# Patient Record
Sex: MALE | Race: WHITE | NOT HISPANIC OR LATINO | Employment: FULL TIME | URBAN - METROPOLITAN AREA
[De-identification: names, ages, dates, MRNs, and addresses within clinical notes are randomized per-mention and may not be internally consistent; named-entity substitution may affect disease eponyms.]

---

## 2017-01-04 DIAGNOSIS — M10.40: ICD-10-CM

## 2017-01-04 RX ORDER — INDOMETHACIN 25 MG/1
CAPSULE ORAL
Qty: 30 CAPSULE | Refills: 0 | Status: SHIPPED | OUTPATIENT
Start: 2017-01-04 | End: 2017-01-11 | Stop reason: SDUPTHER

## 2017-01-11 ENCOUNTER — OFFICE VISIT (OUTPATIENT)
Dept: FAMILY MEDICINE | Facility: CLINIC | Age: 42
End: 2017-01-11
Payer: COMMERCIAL

## 2017-01-11 ENCOUNTER — LAB VISIT (OUTPATIENT)
Dept: LAB | Facility: HOSPITAL | Age: 42
End: 2017-01-11
Attending: FAMILY MEDICINE
Payer: COMMERCIAL

## 2017-01-11 VITALS
TEMPERATURE: 98 F | DIASTOLIC BLOOD PRESSURE: 84 MMHG | HEIGHT: 72 IN | RESPIRATION RATE: 18 BRPM | BODY MASS INDEX: 40.31 KG/M2 | OXYGEN SATURATION: 97 % | SYSTOLIC BLOOD PRESSURE: 130 MMHG | HEART RATE: 72 BPM | WEIGHT: 297.63 LBS

## 2017-01-11 DIAGNOSIS — M10.9 ACUTE GOUT OF LEFT FOOT, UNSPECIFIED CAUSE: Primary | ICD-10-CM

## 2017-01-11 DIAGNOSIS — R73.9 BLOOD GLUCOSE ELEVATED: ICD-10-CM

## 2017-01-11 LAB
CHOLEST/HDLC SERPL: 4.5 {RATIO}
HDL/CHOLESTEROL RATIO: 22.1 %
HDLC SERPL-MCNC: 208 MG/DL
HDLC SERPL-MCNC: 46 MG/DL
LDLC SERPL CALC-MCNC: 129.4 MG/DL
NONHDLC SERPL-MCNC: 162 MG/DL
TRIGL SERPL-MCNC: 163 MG/DL

## 2017-01-11 PROCEDURE — 99214 OFFICE O/P EST MOD 30 MIN: CPT | Mod: S$GLB,,, | Performed by: FAMILY MEDICINE

## 2017-01-11 PROCEDURE — 3079F DIAST BP 80-89 MM HG: CPT | Mod: S$GLB,,, | Performed by: FAMILY MEDICINE

## 2017-01-11 PROCEDURE — 80061 LIPID PANEL: CPT

## 2017-01-11 PROCEDURE — 1159F MED LIST DOCD IN RCRD: CPT | Mod: S$GLB,,, | Performed by: FAMILY MEDICINE

## 2017-01-11 PROCEDURE — 99999 PR PBB SHADOW E&M-EST. PATIENT-LVL III: CPT | Mod: PBBFAC,,, | Performed by: FAMILY MEDICINE

## 2017-01-11 PROCEDURE — 36415 COLL VENOUS BLD VENIPUNCTURE: CPT | Mod: PO

## 2017-01-11 PROCEDURE — 3075F SYST BP GE 130 - 139MM HG: CPT | Mod: S$GLB,,, | Performed by: FAMILY MEDICINE

## 2017-01-11 PROCEDURE — 83036 HEMOGLOBIN GLYCOSYLATED A1C: CPT

## 2017-01-11 RX ORDER — INDOMETHACIN 25 MG/1
25 CAPSULE ORAL 3 TIMES DAILY PRN
Qty: 30 CAPSULE | Refills: 6 | Status: SHIPPED | OUTPATIENT
Start: 2017-01-11 | End: 2017-06-02 | Stop reason: SDUPTHER

## 2017-01-11 NOTE — PROGRESS NOTES
Chief Complaint   Patient presents with    Medication Refill     Indocin 25 mg.     Foot Pain     on top of his Left foot. Mildly uncomfortable       HPI  Paolo Carver Jr. is a 41 y.o. male with multiple medical diagnoses as listed in the medical history and problem list that presents for gout flare.      Gout flare - last week, foot pain prog worsened, began indocin therapy and has improved.     Elevated BG - +DM2 Fam hx.    Pt is known to me and was last seen by me on 9/29/2016.    PAST MEDICAL HISTORY:  Past Medical History   Diagnosis Date    Gout     Hypertension        PAST SURGICAL HISTORY:  No past surgical history on file.    SOCIAL HISTORY:  Social History     Social History    Marital status: Significant Other     Spouse name: N/A    Number of children: N/A    Years of education: N/A     Occupational History    Not on file.     Social History Main Topics    Smoking status: Current Every Day Smoker    Smokeless tobacco: Not on file    Alcohol use Yes    Drug use: Not on file    Sexual activity: Yes     Partners: Female     Other Topics Concern    Not on file     Social History Narrative       FAMILY HISTORY:  No family history on file.    ALLERGIES AND MEDICATIONS: updated and reviewed.  Review of patient's allergies indicates:  No Known Allergies  Current Outpatient Prescriptions   Medication Sig Dispense Refill    benazepril (LOTENSIN) 20 MG tablet TK 1 T PO QD 30 tablet 11    varenicline (CHANTIX STARTING MONTH BOX) 0.5 mg (11)- 1 mg (42) tablet Take one 0.5mg tab by mouth once daily X3 days,then increase to one 0.5mg tab twice daily X4 days,then increase to one 1mg tab twice daily 1 Package 0    indomethacin (INDOCIN) 25 MG capsule Take 1 capsule (25 mg total) by mouth 3 (three) times daily as needed. 30 capsule 6    ULORIC 40 mg Tab TK 1 T PO D. START NOW  1    varenicline (CHANTIX) 1 mg Tab Take 1 tablet (1 mg total) by mouth 2 (two) times daily. 60 tablet 1     No current  facility-administered medications for this visit.        ROS  Review of Systems   Constitutional: Negative for activity change, fatigue and fever.   HENT: Negative for congestion, rhinorrhea and sore throat.    Eyes: Negative for visual disturbance.   Respiratory: Negative for cough, chest tightness and shortness of breath.    Cardiovascular: Negative for chest pain.   Gastrointestinal: Negative for abdominal pain, diarrhea, nausea and vomiting.   Genitourinary: Negative for dysuria, frequency and urgency.   Musculoskeletal: Positive for arthralgias and joint swelling. Negative for back pain and myalgias.   Skin: Negative for rash.   Neurological: Negative for dizziness, syncope and numbness.   Psychiatric/Behavioral: Negative for agitation.       Physical Exam  Vitals:    01/11/17 0822   BP: 130/84   Pulse: 72   Resp: 18   Temp: 98 °F (36.7 °C)    Body mass index is 40.36 kg/(m^2).  Weight: 135 kg (297 lb 9.9 oz)   Height: 6' (182.9 cm)     Physical Exam   Constitutional: He is oriented to person, place, and time. He appears well-developed and well-nourished.   HENT:   Head: Normocephalic and atraumatic.   Eyes: Conjunctivae and EOM are normal. Pupils are equal, round, and reactive to light.   Neck: Normal range of motion. Neck supple.   Cardiovascular: Normal rate, regular rhythm and normal heart sounds.    Pulmonary/Chest: Effort normal and breath sounds normal.   Abdominal: Soft. Bowel sounds are normal.   Musculoskeletal: Normal range of motion.   Neurological: He is alert and oriented to person, place, and time. He has normal reflexes.   L foot pain   Skin: Skin is warm and dry.   Psychiatric: He has a normal mood and affect. His behavior is normal. Judgment and thought content normal.       Health Maintenance       Date Due Completion Date    TETANUS VACCINE 9/5/1995 ---    Pneumococcal PPSV23 (Medium Risk) (1) 9/5/1995 ---    Influenza Vaccine 8/1/2016 ---    Lipid Panel 6/27/2021 6/27/2016           Assessment & Plan    Acute gout of left foot, unspecified cause  -     indomethacin (INDOCIN) 25 MG capsule; Take 1 capsule (25 mg total) by mouth 3 (three) times daily as needed.  Dispense: 30 capsule; Refill: 6  - Refilled meds    Blood glucose elevated  -     Hemoglobin A1c; Future; Expected date: 1/11/17  -     Lipid panel; Future; Expected date: 1/11/17  - Assess labs today.         Return in about 4 weeks (around 2/8/2017), or if symptoms worsen or fail to improve.

## 2017-01-12 LAB
ESTIMATED AVG GLUCOSE: 186 MG/DL
HBA1C MFR BLD HPLC: 8.1 %

## 2017-03-15 ENCOUNTER — OFFICE VISIT (OUTPATIENT)
Dept: FAMILY MEDICINE | Facility: CLINIC | Age: 42
End: 2017-03-15
Payer: COMMERCIAL

## 2017-03-15 VITALS
OXYGEN SATURATION: 97 % | BODY MASS INDEX: 39.3 KG/M2 | HEIGHT: 72 IN | TEMPERATURE: 98 F | DIASTOLIC BLOOD PRESSURE: 86 MMHG | SYSTOLIC BLOOD PRESSURE: 124 MMHG | WEIGHT: 290.13 LBS | RESPIRATION RATE: 17 BRPM | HEART RATE: 100 BPM

## 2017-03-15 DIAGNOSIS — R10.9 FLANK PAIN: Primary | ICD-10-CM

## 2017-03-15 DIAGNOSIS — M62.830 BACK SPASM: ICD-10-CM

## 2017-03-15 LAB
BILIRUB SERPL-MCNC: NEGATIVE MG/DL
BLOOD URINE, POC: NEGATIVE
COLOR, POC UA: YELLOW
GLUCOSE UR QL STRIP: NORMAL
KETONES UR QL STRIP: NEGATIVE
LEUKOCYTE ESTERASE URINE, POC: NEGATIVE
NITRITE, POC UA: NEGATIVE
PH, POC UA: 5
PROTEIN, POC: NEGATIVE
SPECIFIC GRAVITY, POC UA: 1.01
UROBILINOGEN, POC UA: NORMAL

## 2017-03-15 PROCEDURE — 96372 THER/PROPH/DIAG INJ SC/IM: CPT | Mod: S$GLB,,, | Performed by: FAMILY MEDICINE

## 2017-03-15 PROCEDURE — 1160F RVW MEDS BY RX/DR IN RCRD: CPT | Mod: S$GLB,,, | Performed by: FAMILY MEDICINE

## 2017-03-15 PROCEDURE — 3079F DIAST BP 80-89 MM HG: CPT | Mod: S$GLB,,, | Performed by: FAMILY MEDICINE

## 2017-03-15 PROCEDURE — 99999 PR PBB SHADOW E&M-EST. PATIENT-LVL III: CPT | Mod: PBBFAC,,, | Performed by: FAMILY MEDICINE

## 2017-03-15 PROCEDURE — 81001 URINALYSIS AUTO W/SCOPE: CPT | Mod: S$GLB,,, | Performed by: FAMILY MEDICINE

## 2017-03-15 PROCEDURE — 99214 OFFICE O/P EST MOD 30 MIN: CPT | Mod: 25,S$GLB,, | Performed by: FAMILY MEDICINE

## 2017-03-15 PROCEDURE — 3074F SYST BP LT 130 MM HG: CPT | Mod: S$GLB,,, | Performed by: FAMILY MEDICINE

## 2017-03-15 RX ORDER — KETOROLAC TROMETHAMINE 30 MG/ML
60 INJECTION, SOLUTION INTRAMUSCULAR; INTRAVENOUS
Status: COMPLETED | OUTPATIENT
Start: 2017-03-15 | End: 2017-03-15

## 2017-03-15 RX ORDER — HYDROCODONE BITARTRATE AND ACETAMINOPHEN 5; 325 MG/1; MG/1
1 TABLET ORAL 2 TIMES DAILY PRN
Qty: 30 TABLET | Refills: 0 | Status: SHIPPED | OUTPATIENT
Start: 2017-03-15 | End: 2017-08-21

## 2017-03-15 RX ORDER — CYCLOBENZAPRINE HCL 5 MG
5 TABLET ORAL 3 TIMES DAILY PRN
Qty: 40 TABLET | Refills: 1 | Status: SHIPPED | OUTPATIENT
Start: 2017-03-15 | End: 2017-03-25

## 2017-03-15 RX ADMIN — KETOROLAC TROMETHAMINE 60 MG: 30 INJECTION, SOLUTION INTRAMUSCULAR; INTRAVENOUS at 05:03

## 2017-03-15 NOTE — PROGRESS NOTES
Pt tolerated injection of toradol 60mg to left ventrogluteal without difficulty; no adverse reaction noted

## 2017-03-15 NOTE — PROGRESS NOTES
Chief Complaint   Patient presents with    rt side pain     went to [facility] on Monday        HPI  Paolo Carver Jr. is a 41 y.o. male with multiple medical diagnoses as listed in the medical history and problem list that presents for flank pain.      Flank pain - 5 days ago, started, no trauma, began while lying in bed, woke up sore the next morning, went to [facility]: given pain relievers, toradol IM (no relief).     Pt is known to me and was last seen by me on 1/11/2017.    PAST MEDICAL HISTORY:  Past Medical History:   Diagnosis Date    Gout     Hypertension        PAST SURGICAL HISTORY:  History reviewed. No pertinent surgical history.    SOCIAL HISTORY:  Social History     Social History    Marital status: Significant Other     Spouse name: N/A    Number of children: N/A    Years of education: N/A     Occupational History    Not on file.     Social History Main Topics    Smoking status: Current Every Day Smoker    Smokeless tobacco: Not on file    Alcohol use Yes    Drug use: Not on file    Sexual activity: Yes     Partners: Female     Other Topics Concern    Not on file     Social History Narrative       FAMILY HISTORY:  History reviewed. No pertinent family history.    ALLERGIES AND MEDICATIONS: updated and reviewed.  Review of patient's allergies indicates:  No Known Allergies  Current Outpatient Prescriptions   Medication Sig Dispense Refill    benazepril (LOTENSIN) 20 MG tablet TK 1 T PO QD 30 tablet 11    ULORIC 40 mg Tab TK 1 T PO D. START NOW  1    hydrocodone-acetaminophen 5-325mg (NORCO) 5-325 mg per tablet Take 1 tablet by mouth 2 (two) times daily as needed for Pain. 30 tablet 0    indomethacin (INDOCIN) 25 MG capsule Take 1 capsule (25 mg total) by mouth 3 (three) times daily as needed. 30 capsule 6    varenicline (CHANTIX STARTING MONTH BOX) 0.5 mg (11)- 1 mg (42) tablet Take one 0.5mg tab by mouth once daily X3 days,then increase to one 0.5mg tab twice daily X4 days,then  increase to one 1mg tab twice daily 1 Package 0    varenicline (CHANTIX) 1 mg Tab Take 1 tablet (1 mg total) by mouth 2 (two) times daily. 60 tablet 1     No current facility-administered medications for this visit.        ROS  Review of Systems   Constitutional: Negative for activity change, fatigue and fever.   HENT: Negative for congestion, rhinorrhea and sore throat.    Eyes: Negative for visual disturbance.   Respiratory: Negative for cough, chest tightness and shortness of breath.    Cardiovascular: Negative for chest pain.   Gastrointestinal: Negative for abdominal pain, diarrhea, nausea and vomiting.   Genitourinary: Negative for dysuria, frequency and urgency.   Musculoskeletal: Positive for arthralgias and back pain. Negative for myalgias.   Skin: Negative for rash.   Neurological: Negative for dizziness, syncope and numbness.   Psychiatric/Behavioral: Negative for agitation.       Physical Exam  Vitals:    03/15/17 1617   BP: 124/86   Pulse: 100   Resp: 17   Temp: 97.8 °F (36.6 °C)    Body mass index is 39.35 kg/(m^2).  Weight: 131.6 kg (290 lb 2 oz)   Height: 6' (182.9 cm)     Physical Exam   Constitutional: He is oriented to person, place, and time. He appears well-developed and well-nourished.   HENT:   Head: Normocephalic.   Eyes: EOM are normal.   Neck: Normal range of motion.   Musculoskeletal:   Lumbar/flank - dec ROM, mild TTP    Neurological: He is alert and oriented to person, place, and time.   Skin: Skin is warm and dry.   Psychiatric: He has a normal mood and affect. His behavior is normal. Judgment and thought content normal.   Nursing note and vitals reviewed.      Health Maintenance       Date Due Completion Date    Foot Exam 9/5/1987 ---    Eye Exam 9/5/1987 ---    TETANUS VACCINE 9/5/1995 ---    Pneumococcal PPSV23 (Medium Risk) (1) 9/5/1995 ---    Influenza Vaccine 8/1/2016 ---    Hemoglobin A1c 7/11/2017 1/11/2017    Lipid Panel 1/11/2018 1/11/2017          Assessment & Plan    Flank  pain  -     X-Ray Abdomen AP 1 View; Future; Expected date: 3/15/17  -     CBC auto differential; Future; Expected date: 3/15/17  -     Comprehensive metabolic panel; Future; Expected date: 3/15/17  -     ketorolac injection 60 mg; Inject 2 mLs (60 mg total) into the muscle one time.  -     cyclobenzaprine (FLEXERIL) 5 MG tablet; Take 1 tablet (5 mg total) by mouth 3 (three) times daily as needed for Muscle spasms.  Dispense: 40 tablet; Refill: 1  -     hydrocodone-acetaminophen 5-325mg (NORCO) 5-325 mg per tablet; Take 1 tablet by mouth 2 (two) times daily as needed for Pain.  Dispense: 30 tablet; Refill: 0  -     POCT urinalysis, dipstick or tablet reag  - Will treat with pain relief at this time, temporarily    Back spasm  -     ketorolac injection 60 mg; Inject 2 mLs (60 mg total) into the muscle one time.  -     cyclobenzaprine (FLEXERIL) 5 MG tablet; Take 1 tablet (5 mg total) by mouth 3 (three) times daily as needed for Muscle spasms.  Dispense: 40 tablet; Refill: 1  -     hydrocodone-acetaminophen 5-325mg (NORCO) 5-325 mg per tablet; Take 1 tablet by mouth 2 (two) times daily as needed for Pain.  Dispense: 30 tablet; Refill: 0        Return in about 4 weeks (around 4/12/2017).

## 2017-03-16 ENCOUNTER — HOSPITAL ENCOUNTER (OUTPATIENT)
Dept: RADIOLOGY | Facility: HOSPITAL | Age: 42
Discharge: HOME | End: 2017-03-16
Attending: FAMILY MEDICINE
Payer: COMMERCIAL

## 2017-03-16 ENCOUNTER — PATIENT MESSAGE (OUTPATIENT)
Dept: FAMILY MEDICINE | Facility: CLINIC | Age: 42
End: 2017-03-16

## 2017-03-16 DIAGNOSIS — R10.9 FLANK PAIN: ICD-10-CM

## 2017-03-16 PROCEDURE — 74000 XR ABDOMEN AP 1 VIEW: CPT | Mod: TC,PO

## 2017-03-16 PROCEDURE — 74000 XR ABDOMEN AP 1 VIEW: CPT | Mod: 26,,, | Performed by: RADIOLOGY

## 2017-05-11 ENCOUNTER — PATIENT MESSAGE (OUTPATIENT)
Dept: ADMINISTRATIVE | Facility: OTHER | Age: 42
End: 2017-05-11

## 2017-06-01 ENCOUNTER — PATIENT MESSAGE (OUTPATIENT)
Dept: ADMINISTRATIVE | Facility: OTHER | Age: 42
End: 2017-06-01

## 2017-06-02 ENCOUNTER — PATIENT MESSAGE (OUTPATIENT)
Dept: FAMILY MEDICINE | Facility: CLINIC | Age: 42
End: 2017-06-02

## 2017-06-02 DIAGNOSIS — M10.9 ACUTE GOUT OF LEFT FOOT, UNSPECIFIED CAUSE: ICD-10-CM

## 2017-06-02 RX ORDER — INDOMETHACIN 25 MG/1
25 CAPSULE ORAL 3 TIMES DAILY PRN
Qty: 30 CAPSULE | Refills: 6 | Status: SHIPPED | OUTPATIENT
Start: 2017-06-02 | End: 2017-11-13 | Stop reason: SDUPTHER

## 2017-07-31 DIAGNOSIS — E11.9 TYPE 2 DIABETES MELLITUS WITHOUT COMPLICATION: ICD-10-CM

## 2017-08-15 ENCOUNTER — PATIENT MESSAGE (OUTPATIENT)
Dept: OTHER | Facility: OTHER | Age: 42
End: 2017-08-15

## 2017-08-21 ENCOUNTER — PATIENT OUTREACH (OUTPATIENT)
Dept: OTHER | Facility: OTHER | Age: 42
End: 2017-08-21

## 2017-08-21 NOTE — LETTER
Mone Frankel, Arlene  [address] [place] [address]     Dear Paolo Carver Jr.,    Welcome to the [facility]!         My name is Mone Frankel PharmD and I am your dedicated Digital Medicine clinician.  As an expert in medication management, I will help ensure that the medications you are taking continue to provide you with the intended benefits.      I am Mone Clifford and I will be your health  for the duration of the program.  My  job is to help you identify lifestyle changes to improve your blood pressure control.  We will talk about nutrition, exercise, and other ways that you may be able to adjust your current habits to better your health. Together, we will work to improve your overall health and encourage you to meet your goals for a healthier lifestyle.    What we expect from YOU:    You will need to take blood pressure readings multiple times a week and no less than one reading per week.   It is important that you take your measurements at different times during the day, when possible.     What you should expect from your Digital Medicine Care Team:   We will provide you with education about high blood pressure, including lifestyle changes that could help you to control your blood pressure.   We will review your weekly readings and provide you with monthly blood pressure progress reports after you have been in the program for more than 30 days.   We will send monthly progress reports on your blood pressure control to your physician so they can follow along with your progress as well.    You will be able to reach me by phone at [phone] or through your [facility] account by clicking my name under Care Team on the right side of the home screen.    I look forward to working with you to achieve your blood pressure goals!    Sincerely,    Mone Frankel PharmD  Your personal clinician    Please visit  [email or web address] to learn more about high blood pressure and what you can do lower your blood pressure.                                                                                           Paolo Carver Jr.  [address] [place] [address]  [place] [id number]

## 2017-08-21 NOTE — TELEPHONE ENCOUNTER
[facility] Digital Medicine Program Medication Reconciliation Outreach    Called patient to introduce him into the HDMP. Patient able to complete enrollment without difficulty. Reviewed program details including blood pressure goals, technique for taking readings (timing, cuff placement, body positioning, [facility] dmitri), and what to do in case of emergency. Introduced patient to members of care team (health , clinician, and responsible provider). Verified patient's understanding of [facility] dmitri use for contacting clinical team and to ensure that [facility] cuff readings continue to transmit by logging in once every 2 weeks. Confirmation text sent and patient received.    Screening Questionnaire Review:  1. Depression - not indicated  2. Sleep apnea - yes     JULIANNA screening results for this patient suggest a high likelihood of sleep apnea, which can contribute to hypertension. Patient has been previously diagnosed with sleep apnea. Patient reports consistent CPAP use at least 7 hours per night -- CPAP machine use x1 year. JULIANNA is managed effectively with CPAP use. Mr. Carver has been having some issues with his CPAP linking with Bluetooth on his phone. He is in the process of fixing this and will let us know when this is resolved.      Verified the following information with the patient:  1. Medication list  Current Outpatient Prescriptions on File Prior to Visit   Medication Sig Dispense Refill    benazepril (LOTENSIN) 20 MG tablet TK 1 T PO QD 30 tablet 11    indomethacin (INDOCIN) 25 MG capsule Take 1 capsule (25 mg total) by mouth 3 (three) times daily as needed. 30 capsule 6    varenicline (CHANTIX STARTING MONTH BOX) 0.5 mg (11)- 1 mg (42) tablet Take one 0.5mg tab by mouth once daily X3 days,then increase to one 0.5mg tab twice daily X4 days,then increase to one 1mg tab twice daily 1 Package 0    varenicline (CHANTIX) 1 mg Tab Take 1 tablet (1 mg total) by mouth 2 (two) times daily. 60 tablet 1     [DISCONTINUED] hydrocodone-acetaminophen 5-325mg (NORCO) 5-325 mg per tablet Take 1 tablet by mouth 2 (two) times daily as needed for Pain. 30 tablet 0    [DISCONTINUED] ULORIC 40 mg Tab TK 1 T PO D. START NOW  1     No current facility-administered medications on file prior to visit.        No Previous ADRs    2. Medication compliance: has been compliant with the medication regimen    3. Medication Allergies: Review of patient's allergies indicates: No Known Allergies    Explained that our goal is to get his BP consistently below 140/90mmHg. Patient denies having further questions, concerns. BP is at goal.       Last 5 Patient Entered Redings Current 30 Day Average: 129/94     Recent Readings 8/19/2017 8/18/2017 8/16/2017 8/15/2017 8/15/2017    Systolic BP (mmHg) 133 125 123 133 136    Diastolic BP (mmHg) 99 91 91 95 87    Pulse 124 131 95 96 92

## 2017-09-01 ENCOUNTER — PATIENT OUTREACH (OUTPATIENT)
Dept: OTHER | Facility: OTHER | Age: 42
End: 2017-09-01

## 2017-09-01 NOTE — PROGRESS NOTES
Last 5 Patient Entered Redings Current 30 Day Average: 128/91     Recent Readings 8/23/2017 8/23/2017 8/23/2017 8/22/2017 8/22/2017    Systolic BP (mmHg) 130 135 141 122 131    Diastolic BP (mmHg) 94 90 100 70 93    Pulse 104 108 110 73 107        Patient called and informed me that he is out working in [place] for the [employer] relief with his job. [facility] and then follow up on 9/15.

## 2017-09-15 ENCOUNTER — PATIENT OUTREACH (OUTPATIENT)
Dept: OTHER | Facility: OTHER | Age: 42
End: 2017-09-15

## 2017-09-15 ENCOUNTER — HOSPITAL ENCOUNTER (OUTPATIENT)
Dept: RADIOLOGY | Facility: HOSPITAL | Age: 42
Discharge: HOME | End: 2017-09-15
Attending: FAMILY MEDICINE
Payer: COMMERCIAL

## 2017-09-15 ENCOUNTER — OFFICE VISIT (OUTPATIENT)
Dept: FAMILY MEDICINE | Facility: CLINIC | Age: 42
End: 2017-09-15
Payer: COMMERCIAL

## 2017-09-15 VITALS
HEART RATE: 107 BPM | WEIGHT: 259.5 LBS | RESPIRATION RATE: 12 BRPM | SYSTOLIC BLOOD PRESSURE: 132 MMHG | BODY MASS INDEX: 35.15 KG/M2 | HEIGHT: 72 IN | DIASTOLIC BLOOD PRESSURE: 86 MMHG | TEMPERATURE: 98 F | OXYGEN SATURATION: 96 %

## 2017-09-15 DIAGNOSIS — M25.569 KNEE PAIN, UNSPECIFIED CHRONICITY, UNSPECIFIED LATERALITY: ICD-10-CM

## 2017-09-15 DIAGNOSIS — I10 ESSENTIAL HYPERTENSION: ICD-10-CM

## 2017-09-15 DIAGNOSIS — R73.09 ELEVATED GLUCOSE: ICD-10-CM

## 2017-09-15 DIAGNOSIS — Z13.5 SCREENING FOR EYE CONDITION: Primary | ICD-10-CM

## 2017-09-15 PROCEDURE — 99214 OFFICE O/P EST MOD 30 MIN: CPT | Mod: S$GLB,,, | Performed by: FAMILY MEDICINE

## 2017-09-15 PROCEDURE — 73560 X-RAY EXAM OF KNEE 1 OR 2: CPT | Mod: 26,RT,, | Performed by: RADIOLOGY

## 2017-09-15 PROCEDURE — 3079F DIAST BP 80-89 MM HG: CPT | Mod: S$GLB,,, | Performed by: FAMILY MEDICINE

## 2017-09-15 PROCEDURE — 3075F SYST BP GE 130 - 139MM HG: CPT | Mod: S$GLB,,, | Performed by: FAMILY MEDICINE

## 2017-09-15 PROCEDURE — 73560 X-RAY EXAM OF KNEE 1 OR 2: CPT | Mod: TC,PO,RT

## 2017-09-15 PROCEDURE — 3008F BODY MASS INDEX DOCD: CPT | Mod: S$GLB,,, | Performed by: FAMILY MEDICINE

## 2017-09-15 PROCEDURE — 99999 PR PBB SHADOW E&M-EST. PATIENT-LVL III: CPT | Mod: PBBFAC,,, | Performed by: FAMILY MEDICINE

## 2017-09-15 NOTE — PROGRESS NOTES
Chief Complaint   Patient presents with    Hypertension     f/u    Knee Pain     pain in right knee       HPI  Paolo Carver Jr. is a 42 y.o. male with multiple medical diagnoses as listed in the medical history and problem list that presents for follow up.    R knee - hx of gout, 3 months ago is previous exac, +soreness continues +edema.     Pt is known to me and was last seen by me on 3/15/2017.    PAST MEDICAL HISTORY:  Past Medical History:   Diagnosis Date    Gout     Hypertension        PAST SURGICAL HISTORY:  History reviewed. No pertinent surgical history.    SOCIAL HISTORY:  Social History     Social History    Marital status: Significant Other     Spouse name: N/A    Number of children: N/A    Years of education: N/A     Occupational History    Not on file.     Social History Main Topics    Smoking status: Current Every Day Smoker    Smokeless tobacco: Never Used    Alcohol use Yes    Drug use: Unknown    Sexual activity: Yes     Partners: Female     Other Topics Concern    Not on file     Social History Narrative    No narrative on file       FAMILY HISTORY:  History reviewed. No pertinent family history.    ALLERGIES AND MEDICATIONS: updated and reviewed.  Review of patient's allergies indicates:  No Known Allergies  Current Outpatient Prescriptions   Medication Sig Dispense Refill    benazepril (LOTENSIN) 20 MG tablet TK 1 T PO QD 30 tablet 11    indomethacin (INDOCIN) 25 MG capsule Take 1 capsule (25 mg total) by mouth 3 (three) times daily as needed. 30 capsule 6    varenicline (CHANTIX STARTING MONTH BOX) 0.5 mg (11)- 1 mg (42) tablet Take one 0.5mg tab by mouth once daily X3 days,then increase to one 0.5mg tab twice daily X4 days,then increase to one 1mg tab twice daily 1 Package 0    varenicline (CHANTIX) 1 mg Tab Take 1 tablet (1 mg total) by mouth 2 (two) times daily. 60 tablet 1     No current facility-administered medications for this visit.        ROS  Review of Systems    Constitutional: Negative for activity change, fatigue and fever.   HENT: Negative for congestion, rhinorrhea and sore throat.    Eyes: Negative for visual disturbance.   Respiratory: Negative for cough, chest tightness and shortness of breath.    Cardiovascular: Negative for chest pain.   Gastrointestinal: Negative for abdominal pain, diarrhea, nausea and vomiting.   Genitourinary: Negative for dysuria, frequency and urgency.   Musculoskeletal: Positive for arthralgias and joint swelling. Negative for back pain and myalgias.   Skin: Negative for rash.   Neurological: Negative for dizziness, syncope and numbness.   Psychiatric/Behavioral: Negative for agitation.       Physical Exam  Vitals:    09/15/17 1252   BP: 132/86   Pulse: 107   Resp: 12   Temp: 98.2 °F (36.8 °C)    Body mass index is 35.19 kg/m².  Weight: 117.7 kg (259 lb 7.7 oz)   Height: 6' (182.9 cm)     Physical Exam   Constitutional: He is oriented to person, place, and time. He appears well-developed and well-nourished.   HENT:   Head: Normocephalic.   Eyes: EOM are normal.   Neck: Normal range of motion.   Musculoskeletal:   R knee - mild valgus pain, minimal edema, +huber's   Neurological: He is alert and oriented to person, place, and time.   Skin: Skin is warm and dry.   Psychiatric: He has a normal mood and affect. His behavior is normal. Judgment and thought content normal.   Nursing note and vitals reviewed.      Health Maintenance       Date Due Completion Date    Foot Exam 09/05/1987 ---    TETANUS VACCINE 09/05/1995 ---    Pneumococcal PPSV23 (Medium Risk) (1) 09/05/1995 ---    Eye Exam 05/13/2017 5/13/2016    Influenza Vaccine 08/01/2017 ---    Lipid Panel 01/11/2018 1/11/2017    Hemoglobin A1c 03/15/2018 9/15/2017 (Done)    Override on 9/15/2017: Done          Assessment & Plan    Screening for eye condition  -     Ambulatory referral to Optometry    Essential hypertension  - Continue current medication regimen as prescribed    Knee  pain, unspecified chronicity, unspecified laterality  -     X-Ray Knee 1 or 2 View Right; Future; Expected date: 09/15/2017  -     Uric acid; Future; Expected date: 09/15/2017  - Assess labs today    Elevated glucose  -     Hemoglobin A1c; Future; Expected date: 09/15/2017        Return in about 4 weeks (around 10/13/2017).

## 2017-09-15 NOTE — PROGRESS NOTES
Last 5 Patient Entered Redings Current 30 Day Average: 128/91     Recent Readings 9/14/2017 8/23/2017 8/23/2017 8/23/2017 8/22/2017    Systolic BP (mmHg) 131 130 135 141 122    Diastolic BP (mmHg) 91 94 90 100 70    Pulse 121 104 108 110 73          Hypertension Digital Medicine Program (HDMP): Health  Introduction    Introduced Mr. Paolo Carver  to HDMP. Discussed health  role and recommended lifestyle modifications.    Diet (i.e. Low sodium, food labels): Patient is not new to a low sodium diet. He does not add salt to meals and tries to monitor it as much as he can. His wife does all of the cooking and the shopping in their household and she is very keen on healthy foods. He will remind her about watching the salt intake and reading food labels while shopping.   Exercise: Patient goes to the gym 4-5 times each week and is active with his job.   Medication Adherence: has been compliant with the medicaiton regimen      Reviewed AHA recommendations:  Limit sodium intake to <2000mg/day  Perform 150 minutes of physical activity per week    Patient is aware of the importance of taking daily BP readings at various times of the day  Patient aware that the health  can be used as a resource for lifestyle modifications to help reduce or maintain a healthy BP  Patient is aware of the importance of medication adherence.  Patient is aware that HDMP team is not available for emergencies. Patient should call 911 or [facility] on Call if one arises.

## 2017-09-18 ENCOUNTER — TELEPHONE (OUTPATIENT)
Dept: FAMILY MEDICINE | Facility: CLINIC | Age: 42
End: 2017-09-18

## 2017-09-18 DIAGNOSIS — E11.9 TYPE 2 DIABETES MELLITUS WITHOUT COMPLICATION, UNSPECIFIED LONG TERM INSULIN USE STATUS: Primary | ICD-10-CM

## 2017-09-18 NOTE — TELEPHONE ENCOUNTER
----- Message from Estela Vuong PA-C sent at 9/18/2017 10:21 AM CDT -----  Diabetes extremely uncontrolled, recommend pt seeing L[facility] in endocrine

## 2017-09-18 NOTE — TELEPHONE ENCOUNTER
..Patient notified of results as noted below, patient verbalized understanding. Patient ok with see Lien

## 2017-09-20 ENCOUNTER — PATIENT MESSAGE (OUTPATIENT)
Dept: FAMILY MEDICINE | Facility: CLINIC | Age: 42
End: 2017-09-20

## 2017-09-20 DIAGNOSIS — E11.9 TYPE 2 DIABETES MELLITUS WITHOUT COMPLICATION, WITHOUT LONG-TERM CURRENT USE OF INSULIN: Primary | ICD-10-CM

## 2017-09-20 RX ORDER — METFORMIN HYDROCHLORIDE 500 MG/1
500 TABLET ORAL 2 TIMES DAILY WITH MEALS
Qty: 180 TABLET | Refills: 3 | Status: SHIPPED | OUTPATIENT
Start: 2017-09-20 | End: 2017-09-28 | Stop reason: DRUGHIGH

## 2017-09-22 ENCOUNTER — TELEPHONE (OUTPATIENT)
Dept: FAMILY MEDICINE | Facility: CLINIC | Age: 42
End: 2017-09-22

## 2017-09-27 ENCOUNTER — TELEPHONE (OUTPATIENT)
Dept: ENDOCRINOLOGY | Facility: CLINIC | Age: 42
End: 2017-09-27

## 2017-09-27 DIAGNOSIS — F17.200 NICOTINE DEPENDENCE, UNCOMPLICATED, UNSPECIFIED NICOTINE PRODUCT TYPE: ICD-10-CM

## 2017-09-27 RX ORDER — VARENICLINE TARTRATE 1 MG/1
TABLET, FILM COATED ORAL
Qty: 60 TABLET | Refills: 0 | Status: SHIPPED | OUTPATIENT
Start: 2017-09-27

## 2017-09-28 ENCOUNTER — OFFICE VISIT (OUTPATIENT)
Dept: ENDOCRINOLOGY | Facility: CLINIC | Age: 42
End: 2017-09-28
Payer: COMMERCIAL

## 2017-09-28 ENCOUNTER — LAB VISIT (OUTPATIENT)
Dept: LAB | Facility: HOSPITAL | Age: 42
End: 2017-09-28
Attending: NURSE PRACTITIONER
Payer: COMMERCIAL

## 2017-09-28 VITALS
HEART RATE: 83 BPM | SYSTOLIC BLOOD PRESSURE: 121 MMHG | BODY MASS INDEX: 35.33 KG/M2 | HEIGHT: 72 IN | DIASTOLIC BLOOD PRESSURE: 83 MMHG | WEIGHT: 260.81 LBS

## 2017-09-28 DIAGNOSIS — R63.4 WEIGHT LOSS, NON-INTENTIONAL: ICD-10-CM

## 2017-09-28 DIAGNOSIS — I10 ESSENTIAL HYPERTENSION: ICD-10-CM

## 2017-09-28 DIAGNOSIS — G47.33 OSA (OBSTRUCTIVE SLEEP APNEA): ICD-10-CM

## 2017-09-28 LAB
C PEPTIDE SERPL-MCNC: 2.35 NG/ML
GLUCOSE SERPL-MCNC: 326 MG/DL

## 2017-09-28 PROCEDURE — 99204 OFFICE O/P NEW MOD 45 MIN: CPT | Mod: S$GLB,,, | Performed by: NURSE PRACTITIONER

## 2017-09-28 PROCEDURE — 3008F BODY MASS INDEX DOCD: CPT | Mod: S$GLB,,, | Performed by: NURSE PRACTITIONER

## 2017-09-28 PROCEDURE — 99999 PR PBB SHADOW E&M-EST. PATIENT-LVL IV: CPT | Mod: PBBFAC,,, | Performed by: NURSE PRACTITIONER

## 2017-09-28 PROCEDURE — 86341 ISLET CELL ANTIBODY: CPT | Mod: 91

## 2017-09-28 PROCEDURE — 84681 ASSAY OF C-PEPTIDE: CPT

## 2017-09-28 PROCEDURE — 3079F DIAST BP 80-89 MM HG: CPT | Mod: S$GLB,,, | Performed by: NURSE PRACTITIONER

## 2017-09-28 PROCEDURE — 86341 ISLET CELL ANTIBODY: CPT

## 2017-09-28 PROCEDURE — 36415 COLL VENOUS BLD VENIPUNCTURE: CPT | Mod: PN

## 2017-09-28 PROCEDURE — 82947 ASSAY GLUCOSE BLOOD QUANT: CPT

## 2017-09-28 PROCEDURE — 3074F SYST BP LT 130 MM HG: CPT | Mod: S$GLB,,, | Performed by: NURSE PRACTITIONER

## 2017-09-28 RX ORDER — METFORMIN HYDROCHLORIDE 500 MG/1
1000 TABLET, EXTENDED RELEASE ORAL 2 TIMES DAILY WITH MEALS
Qty: 120 TABLET | Refills: 0 | Status: SHIPPED | OUTPATIENT
Start: 2017-09-28 | End: 2017-10-25 | Stop reason: SDUPTHER

## 2017-09-28 NOTE — PATIENT INSTRUCTIONS
Increase metformin to 2 tabs twice daily with meals. Can switch to metformin XR tablets if having upset stomach.   See Diabetes Educator/Registered Dietician.   Labs today to rule out type 1 diabetes.   May need to start insulin if glucoses are markedly high.     Return to clinic in 4 weeks.

## 2017-09-28 NOTE — PROGRESS NOTES
CC: This 42 y.o. White male  is here for evaluation of  T2DM along with comorbidities indicated in the Visit Diagnosis section of this encounter.    HPI: Paolo Carver Jr. states he discovered he had diabetes just this month due to elevated A1c of > 14% associated with a 30 lb weight loss. However, his a1c was elevated at 6.9% in June 2016 and heather to 8.1% in January.     New to Endocrine, referred by Dr. Ayala.  He was started on metformin 1 week ago. Has also improved diet.   Arrives with his wife Alyson.     PMHX: JULIANNA on cpap     LAST DIABETES EDUCATION: never     PRESCRIBED DIABETES MEDICATIONS: metformin 500 mg bid ac   Misses medication doses - No    DM COMPLICATIONS: none    SIGNIFICANT DIABETES MED HISTORY: n/a     SELF MONITORING BLOOD GLUCOSE: Checks blood glucose at home - 1-2x/day and bgs range 285-341    HYPOGLYCEMIC EPISODES: n/a     CURRENT DIET: drinks water and diet coke zero. Eats 3 meals/day. Minimal snacks.   Diet recall: dinner was chicken breast, green beans, baked sweet potato. Lunch was hot sausage croissant sandwich. Breakfast was 2 eggs scrambled eggs and 2 pieces of castillo.     CURRENT EXERCISE: none in the last few weeks. Used to go the gym.     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       /83 (BP Location: Right arm, Patient Position: Sitting, BP Method: X-Large (Automatic))   Pulse 83   Ht 6' (1.829 m)   Wt 118.3 kg (260 lb 12.9 oz)   BMI 35.37 kg/m²       ROS:   CONSTITUTIONAL: Appetite good, denies fatigue  SKIN: No rash or pruritis   EYES: No visual disturbances  RESPIRATORY: No shortness of breath or cough  CARDIAC: No chest pain or palpitations  GI: No nausea, vomiting, or diarrhea  : No urinary frequency or dysuria   MS: No arthralgias or mylagias   NEURO: No paresthesias or tremors.   OTHER: n/a      PHYSICAL EXAM:  GENERAL: Well developed, well nourished. No acute distress.   PSYCH: AAOx3, appropriate mood and affect,  conversant, well-groomed. Judgement and insight good.   NEURO: Cranial nerves grossly intact. Speech clear, no tremor.   NECK: Trachea midline, no thyromegaly or lymphadenopathy.   CHEST: Respirations even and unlabored. CTA bilaterally.  CARDIOVASCULAR: Regular rate and rhythm. No bruits. No murmur. No edema.   ABDOMEN: Soft, non-tender, non-distended. Bowel sounds present.   MS: Gait steady. No clubbing.   SKIN: Normal skin turgor. Skin warm and dry. No areas of breakdown. No acanthosis nigricans.    Protective Sensation (w/ 10 gram monofilament):  Right: Intact  Left: Intact     vibratory sensation absent to BLE.       Visual Inspection:  Normal -  Bilateral    Pedal Pulses:   Right: Present  Left: Present      Hemoglobin A1C   Date Value Ref Range Status   09/15/2017 >14.0 (H) 4.0 - 5.6 % Final     Comment:     According to ADA guidelines, hemoglobin A1c <7.0% represents  optimal control in non-pregnant diabetic patients. Different  metrics may apply to specific patient populations.   Standards of Medical Care in Diabetes-2016.  For the purpose of screening for the presence of diabetes:  <5.7%     Consistent with the absence of diabetes  5.7-6.4%  Consistent with increasing risk for diabetes   (prediabetes)  >or=6.5%  Consistent with diabetes  Currently, no consensus exists for use of hemoglobin A1c  for diagnosis of diabetes for children.  This Hemoglobin A1c assay has significant interference with fetal   hemoglobin   (HbF). The results are invalid for patients with abnormal amounts of   HbF,   including those with known Hereditary Persistence   of Fetal Hemoglobin. Heterozygous hemoglobin variants (HbAS, HbAC,   HbAD, HbAE, HbA2) do not significantly interfere with this assay;   however, presence of multiple variants in a sample may impact the %   interference.     01/11/2017 8.1 (H) 4.5 - 6.2 % Final     Comment:     According to ADA guidelines, hemoglobin A1C <7.0% represents  optimal control in  non-pregnant diabetic patients.  Different  metrics may apply to specific populations.   Standards of Medical Care in Diabetes - 2016.  For the purpose of screening for the presence of diabetes:  <5.7%     Consistent with the absence of diabetes  5.7-6.4%  Consistent with increasing risk for diabetes   (prediabetes)  >or=6.5%  Consistent with diabetes  Currently no consensus exists for use of hemoglobin A1C  for diagnosis of diabetes for children.     06/27/2016 6.9 (H) 4.5 - 6.2 % Final           Chemistry        Component Value Date/Time     03/16/2017 0900    K 4.0 03/16/2017 0900     03/16/2017 0900    CO2 28 03/16/2017 0900    BUN 14 03/16/2017 0900    CREATININE 1.0 03/16/2017 0900     (H) 03/16/2017 0900        Component Value Date/Time    CALCIUM 9.7 03/16/2017 0900    ALKPHOS 71 03/16/2017 0900    AST 24 03/16/2017 0900    ALT 48 (H) 03/16/2017 0900    BILITOT 0.6 03/16/2017 0900    ESTGFRAFRICA >60.0 03/16/2017 0900    EGFRNONAA >60.0 03/16/2017 0900          Lab Results   Component Value Date    LDLCALC 129.4 01/11/2017         Ref. Range 1/11/2017 09:09   Cholesterol Latest Ref Range: 120 - 199 mg/dL 208 (H)   HDL Latest Ref Range: 40 - 75 mg/dL 46   LDL Cholesterol Latest Ref Range: 63.0 - 159.0 mg/dL 129.4   Total Cholesterol/HDL Ratio Latest Ref Range: 2.0 - 5.0  4.5   Triglycerides Latest Ref Range: 30 - 150 mg/dL 163 (H)       No results found for: MICALBCREAT        STANDARDS of CARE:        ASA:               Last eye exam:       ASSESSMENT and PLAN:    A1C GOAL:     1. Uncontrolled type 2 diabetes mellitus without complication, without long-term current use of insulin  Increase metformin to 2 tabs twice daily with meals. Can switch to metformin XR tablets if having upset stomach.   See Diabetes Educator/Registered Dietician.   Labs today to rule out type 1 diabetes.   May need to start insulin if glucoses are still markedly high in order overcome glucose toxicity.     Return to  clinic in 4 weeks.     Ambulatory Referral to Diabetes Education    Anti-islet cell antibody    C-peptide    Glucose, random    Glutamic acid decarboxylase   2. Essential hypertension  controlled   3. JULIANNA (obstructive sleep apnea)  On cpap    4. Weight loss, non-intentional  Likely r/t marked increase in glycemia        Orders Placed This Encounter   Procedures    Anti-islet cell antibody     Standing Status:   Future     Number of Occurrences:   1     Standing Expiration Date:   11/27/2018    C-peptide     Standing Status:   Future     Number of Occurrences:   1     Standing Expiration Date:   11/27/2018    Glucose, random     Standing Status:   Future     Number of Occurrences:   1     Standing Expiration Date:   11/27/2018    Glutamic acid decarboxylase     Standing Status:   Future     Number of Occurrences:   1     Standing Expiration Date:   11/27/2018    Ambulatory Referral to Diabetes Education     Referral Priority:   Routine     Referral Type:   Consultation     Referral Reason:   Specialty Services Required     Requested Specialty:   Endocrinology     Number of Visits Requested:   1        Return in about 4 weeks (around 10/26/2017).     Thank you very much for allowing me to participate in Paolo Carver Jr.'s care.

## 2017-09-28 NOTE — LETTER
September 28, 2017      Estela Vuong PA-C  [address]  [place] [address]           [facility]  [address]  [place] [address]  Phone: [phone]  Fax: [phone]          Patient: Paolo Carver Jr.   MR Number: [id number]   YOB: 1975   Date of Visit: 9/28/2017       Dear Estela Vuong:    Thank you for referring Paolo Carver to me for evaluation. Attached you will find relevant portions of my assessment and plan of care.    If you have questions, please do not hesitate to call me. I look forward to following Paolo Carver along with you.    Sincerely,    Lien Hermosillo NP    Enclosure  CC:  No Recipients    If you would like to receive this communication electronically, please contact [email or web address] or [phone] to request more information on [email or web address] Link access.    For providers and/or their staff who would like to refer a patient to [facility], please contact us through our one-stop-shop provider referral line, [facility], at [phone].    If you feel you have received this communication in error or would no longer like to receive these types of communications, please e-mail [email or web address]

## 2017-09-30 LAB — PANC ISLET CELL IGG SER-ACNC: NORMAL

## 2017-10-03 LAB — GAD65 AB SER-SCNC: 0 NMOL/L

## 2017-10-04 ENCOUNTER — PATIENT MESSAGE (OUTPATIENT)
Dept: ENDOCRINOLOGY | Facility: CLINIC | Age: 42
End: 2017-10-04

## 2017-10-04 RX ORDER — INSULIN GLARGINE 100 [IU]/ML
INJECTION, SOLUTION SUBCUTANEOUS
Qty: 1 BOX | Refills: 0 | Status: SHIPPED | OUTPATIENT
Start: 2017-10-04 | End: 2017-10-05 | Stop reason: ALTCHOICE

## 2017-10-04 RX ORDER — PEN NEEDLE, DIABETIC 30 GX3/16"
1 NEEDLE, DISPOSABLE MISCELLANEOUS DAILY
Qty: 100 EACH | Refills: 4 | Status: SHIPPED | OUTPATIENT
Start: 2017-10-04

## 2017-10-05 ENCOUNTER — TELEPHONE (OUTPATIENT)
Dept: ENDOCRINOLOGY | Facility: CLINIC | Age: 42
End: 2017-10-05

## 2017-10-05 RX ORDER — INSULIN GLARGINE 100 [IU]/ML
INJECTION, SOLUTION SUBCUTANEOUS
Qty: 1 BOX | Refills: 0 | Status: SHIPPED | OUTPATIENT
Start: 2017-10-05 | End: 2017-10-25 | Stop reason: SDUPTHER

## 2017-10-05 NOTE — TELEPHONE ENCOUNTER
rx for lantus sent instead since basaglar not covered. He can come tomorrow for insulin training.

## 2017-10-06 ENCOUNTER — PATIENT OUTREACH (OUTPATIENT)
Dept: OTHER | Facility: OTHER | Age: 42
End: 2017-10-06

## 2017-10-06 NOTE — PROGRESS NOTES
"Last 5 Patient Entered Redings Current 30 Day Average: 133/94     Recent Readings 10/4/2017 10/2/2017 9/27/2017 9/23/2017 9/21/2017    Systolic BP (mmHg) 132 125 129 138 133    Diastolic BP (mmHg) 96 86 88 95 94    Pulse 88 80 91 93 111        Called Mr. Carver to introduce him to the Hypertension Digital Medicine Program.     Mr. Carver's DBP is above 90. Explained to him that if he can work on reducing sodium intake, he can likely get his DBP down to less than 90. Also explained, that if not, we will discuss adding a low dose of chlorthalidone. He does wear CPAP every night, but it is not bluetooth capable to input information into Epic. He has been taking benazepril for about 10 years. He mentioned that he felt the cuff may "have something wrong with it" as his BP in clinic visits is usually in 120-130s/80-90s. Explained that there are variances from cuff to cuff, but his readings in clinic are very similar to readings on the HDMP cuff.     Reviewed patient's medications and verified allergies on file.   Hypertension Medications             benazepril (LOTENSIN) 20 MG tablet TK 1 T PO QD        Explained that we expect him to obtain several blood pressures/week at random times of day. Also asked that the BP be taken at least 1 hour after taking BP medications.     Explained that our goal is to get his BP to consistently below 140/90mmHg.     Patient and I agreed that the patient will take his BP daily to every other day at varying times of the day.     Emailed patient link to [facility] webpage as well as my direct phone number in case he/ she has any questions.       "

## 2017-10-09 ENCOUNTER — TELEPHONE (OUTPATIENT)
Dept: ENDOCRINOLOGY | Facility: CLINIC | Age: 42
End: 2017-10-09

## 2017-10-09 NOTE — TELEPHONE ENCOUNTER
----- Message from Giuliana Heller LPN sent at 9/28/2017  8:05 AM CDT -----  Regarding: Schedule DM ED w/ Caro Tolbert  Need to schedule DM ED

## 2017-10-09 NOTE — TELEPHONE ENCOUNTER
Left message on patient's voicemail to return call to clinic to schedule DM Education appointment with Caro Tolbert. Waiting to hear back.

## 2017-10-18 ENCOUNTER — PATIENT OUTREACH (OUTPATIENT)
Dept: OTHER | Facility: OTHER | Age: 42
End: 2017-10-18

## 2017-10-18 NOTE — PROGRESS NOTES
Last 5 Patient Entered Redings Current 30 Day Average: 129/91     Recent Readings 10/16/2017 10/9/2017 10/4/2017 10/2/2017 9/27/2017    Systolic BP (mmHg) 126 115 132 125 129    Diastolic BP (mmHg) 84 90 96 86 88    Pulse 93 106 88 80 91          Hypertension Digital Medicine (HDMP) Health  Follow Up    LVM to follow up with Mr. Paolo Carver Jr..    Per 30 day average, blood pressure is not well controlled 129/91 mmHg but very close. Encouraged adherence to low sodium diet and physical activity guidelines (remined him about the importance of maintaining a low sodium diet and to be careful with foods while on the go etc). Requested patient call or message back so we can discuss his readings/obtain an update. Will continue to monitor/follow up.

## 2017-10-24 ENCOUNTER — TELEPHONE (OUTPATIENT)
Dept: ENDOCRINOLOGY | Facility: CLINIC | Age: 42
End: 2017-10-24

## 2017-10-25 ENCOUNTER — OFFICE VISIT (OUTPATIENT)
Dept: ENDOCRINOLOGY | Facility: CLINIC | Age: 42
End: 2017-10-25
Payer: COMMERCIAL

## 2017-10-25 VITALS
SYSTOLIC BLOOD PRESSURE: 137 MMHG | HEIGHT: 72 IN | WEIGHT: 262.56 LBS | BODY MASS INDEX: 35.56 KG/M2 | DIASTOLIC BLOOD PRESSURE: 85 MMHG | HEART RATE: 93 BPM

## 2017-10-25 DIAGNOSIS — E66.9 NON MORBID OBESITY, UNSPECIFIED OBESITY TYPE: ICD-10-CM

## 2017-10-25 DIAGNOSIS — I10 ESSENTIAL HYPERTENSION: ICD-10-CM

## 2017-10-25 PROCEDURE — 99214 OFFICE O/P EST MOD 30 MIN: CPT | Mod: S$GLB,,, | Performed by: NURSE PRACTITIONER

## 2017-10-25 PROCEDURE — 99999 PR PBB SHADOW E&M-EST. PATIENT-LVL III: CPT | Mod: PBBFAC,,, | Performed by: NURSE PRACTITIONER

## 2017-10-25 RX ORDER — INSULIN GLARGINE 100 [IU]/ML
INJECTION, SOLUTION SUBCUTANEOUS
Qty: 2 BOX | Refills: 0 | Status: SHIPPED | OUTPATIENT
Start: 2017-10-25 | End: 2017-11-30 | Stop reason: SDUPTHER

## 2017-10-25 RX ORDER — METFORMIN HYDROCHLORIDE 500 MG/1
1000 TABLET, EXTENDED RELEASE ORAL 2 TIMES DAILY WITH MEALS
Qty: 360 TABLET | Refills: 1 | Status: SHIPPED | OUTPATIENT
Start: 2017-10-25 | End: 2018-01-24 | Stop reason: SDUPTHER

## 2017-10-25 NOTE — PROGRESS NOTES
CC: This 42 y.o. White male  is here for evaluation of  T2DM along with comorbidities indicated in the Visit Diagnosis section of this encounter.    HPI: Paolo Carver Jr. states he discovered he had diabetes just this month due to elevated A1c of > 14% associated with a 30 lb weight loss. However, his a1c was elevated at 6.9% in June 2016 and heather to 8.1% in January.     Initial visit on 9/28/17  New to Endocrine, referred by Dr. Ayala.  He was started on metformin 1 week ago. Has also improved diet.   Arrives with his wife Alyson. Plan Increase metformin to 2 tabs twice daily with meals. Can switch to metformin XR tablets if having upset stomach.   See Diabetes Educator/Registered Dietician.   Labs today to rule out type 1 diabetes.   May need to start insulin if glucoses are still markedly high in order overcome glucose toxicity.   Return to clinic in 4 weeks.     Interval history  Labs are negative for t1dm. But d/t highly uncontrolled BGs, he was started on basal insulin. Feels much better w/ more energy. Has no problems using insulin or staying on insulin therapy.     PMHX: JULIANNA on cpap     LAST DIABETES EDUCATION: never     PRESCRIBED DIABETES MEDICATIONS: metformin xr 1000 mg bid, Lantus Solostar 24 units every morning.   Misses medication doses - No    DM COMPLICATIONS: none    SIGNIFICANT DIABETES MED HISTORY: n/a     SELF MONITORING BLOOD GLUCOSE: Checks blood glucose at home - 1-2x/day   Fasting 145, 164, 149, 122, 135, 119  predinner 9  2 hr post dinner 159, 138,  Hs 151, 154, 156    HYPOGLYCEMIC EPISODES: denies symptoms      CURRENT DIET: drinks water and diet coke zero. Eats 3 meals/day. Minimal snacks.     CURRENT EXERCISE: started back to gym last week, a few times a week.     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       /85 (BP Location: Right arm, Patient Position: Sitting, BP Method: X-Large (Automatic))   Pulse 93   Ht 6' (1.829 m)   Wt  119.1 kg (262 lb 9.1 oz)   BMI 35.61 kg/m²       ROS:   CONSTITUTIONAL: Appetite good, denies fatigue  RESPIRATORY: No shortness of breath   CARDIAC: No chest pain       PHYSICAL EXAM:  GENERAL: Well developed, well nourished. No acute distress.   PSYCH: AAOx3, appropriate mood and affect, conversant, well-groomed. Judgement and insight good.   NEURO: Cranial nerves grossly intact. Speech clear, no tremor.   CHEST: Respirations even and unlabored.      Hemoglobin A1C   Date Value Ref Range Status   09/15/2017 >14.0 (H) 4.0 - 5.6 % Final     Comment:     According to ADA guidelines, hemoglobin A1c <7.0% represents  optimal control in non-pregnant diabetic patients. Different  metrics may apply to specific patient populations.   Standards of Medical Care in Diabetes-2016.  For the purpose of screening for the presence of diabetes:  <5.7%     Consistent with the absence of diabetes  5.7-6.4%  Consistent with increasing risk for diabetes   (prediabetes)  >or=6.5%  Consistent with diabetes  Currently, no consensus exists for use of hemoglobin A1c  for diagnosis of diabetes for children.  This Hemoglobin A1c assay has significant interference with fetal   hemoglobin   (HbF). The results are invalid for patients with abnormal amounts of   HbF,   including those with known Hereditary Persistence   of Fetal Hemoglobin. Heterozygous hemoglobin variants (HbAS, HbAC,   HbAD, HbAE, HbA2) do not significantly interfere with this assay;   however, presence of multiple variants in a sample may impact the %   interference.     01/11/2017 8.1 (H) 4.5 - 6.2 % Final     Comment:     According to ADA guidelines, hemoglobin A1C <7.0% represents  optimal control in non-pregnant diabetic patients.  Different  metrics may apply to specific populations.   Standards of Medical Care in Diabetes - 2016.  For the purpose of screening for the presence of diabetes:  <5.7%     Consistent with the absence of diabetes  5.7-6.4%  Consistent with  increasing risk for diabetes   (prediabetes)  >or=6.5%  Consistent with diabetes  Currently no consensus exists for use of hemoglobin A1C  for diagnosis of diabetes for children.     06/27/2016 6.9 (H) 4.5 - 6.2 % Final           Chemistry        Component Value Date/Time     03/16/2017 0900    K 4.0 03/16/2017 0900     03/16/2017 0900    CO2 28 03/16/2017 0900    BUN 14 03/16/2017 0900    CREATININE 1.0 03/16/2017 0900     (H) 09/28/2017 0816        Component Value Date/Time    CALCIUM 9.7 03/16/2017 0900    ALKPHOS 71 03/16/2017 0900    AST 24 03/16/2017 0900    ALT 48 (H) 03/16/2017 0900    BILITOT 0.6 03/16/2017 0900    ESTGFRAFRICA >60.0 03/16/2017 0900    EGFRNONAA >60.0 03/16/2017 0900          Lab Results   Component Value Date    LDLCALC 129.4 01/11/2017         Ref. Range 1/11/2017 09:09   Cholesterol Latest Ref Range: 120 - 199 mg/dL 208 (H)   HDL Latest Ref Range: 40 - 75 mg/dL 46   LDL Cholesterol Latest Ref Range: 63.0 - 159.0 mg/dL 129.4   Total Cholesterol/HDL Ratio Latest Ref Range: 2.0 - 5.0  4.5   Triglycerides Latest Ref Range: 30 - 150 mg/dL 163 (H)       No results found for: MICALBCREAT        STANDARDS of CARE:        ASA:               Last eye exam:       ASSESSMENT and PLAN:    A1C GOAL: <7%     1. Uncontrolled type 2 diabetes mellitus with complication, with long-term current use of insulin  BGs are near goal. Increase Lantus to 28 units every day.   Continue metformin.   Test bg 1-2x/day. rtc in 3 mo w/ labs prior.   Keep appt with diab educator next week.     Hemoglobin A1c    Comprehensive metabolic panel    Lipid panel    Microalbumin/creatinine urine ratio   2. Essential hypertension  controlled   3. Non morbid obesity, unspecified obesity type  Increases insulin resistance.   Continue exercise.        Orders Placed This Encounter   Procedures    Hemoglobin A1c     Standing Status:   Future     Standing Expiration Date:   12/24/2018    Comprehensive metabolic  panel     Standing Status:   Future     Standing Expiration Date:   12/24/2018    Lipid panel     Standing Status:   Future     Standing Expiration Date:   10/25/2018    Microalbumin/creatinine urine ratio     Standing Status:   Future     Standing Expiration Date:   12/24/2018     Order Specific Question:   Specimen Source     Answer:   Urine        Return in about 3 months (around 1/25/2018).

## 2017-11-08 ENCOUNTER — PATIENT OUTREACH (OUTPATIENT)
Dept: OTHER | Facility: OTHER | Age: 42
End: 2017-11-08

## 2017-11-08 NOTE — PROGRESS NOTES
Last 5 Patient Entered Readings Current 30 Day Average: 128/90     Recent Readings 11/7/2017 11/6/2017 11/6/2017 11/1/2017 10/23/2017    Systolic BP (mmHg) 128 139 140 124 138    Diastolic BP (mmHg) 89 94 97 92 93    Pulse 95 101 104 89 85        Mr. Carver's BP average is controlled. He has no questions or concerns. He's trying to avoid fast food and goes to the gym 3 times a week. No changes required to his medications.     Current HTN regimen:  Hypertension Medications             benazepril (LOTENSIN) 20 MG tablet TK 1 T PO QD        Will continue to monitor regularly. Will follow up in 3-4 months, sooner if BP begins to trend upward or downward.    Patient has my contact information and knows to call with any concerns or clinical changes.

## 2017-11-13 DIAGNOSIS — M10.9 ACUTE GOUT OF LEFT FOOT, UNSPECIFIED CAUSE: ICD-10-CM

## 2017-11-13 DIAGNOSIS — I10 ESSENTIAL HYPERTENSION: ICD-10-CM

## 2017-11-13 RX ORDER — BENAZEPRIL HYDROCHLORIDE 20 MG/1
TABLET ORAL
Qty: 30 TABLET | Refills: 11 | Status: SHIPPED | OUTPATIENT
Start: 2017-11-13

## 2017-11-13 RX ORDER — INDOMETHACIN 25 MG/1
25 CAPSULE ORAL 3 TIMES DAILY PRN
Qty: 30 CAPSULE | Refills: 6 | Status: SHIPPED | OUTPATIENT
Start: 2017-11-13 | End: 2018-05-30 | Stop reason: SDUPTHER

## 2017-11-30 RX ORDER — INSULIN GLARGINE 100 [IU]/ML
INJECTION, SOLUTION SUBCUTANEOUS
Qty: 2 BOX | Refills: 0 | Status: SHIPPED | OUTPATIENT
Start: 2017-11-30 | End: 2018-04-25 | Stop reason: ALTCHOICE

## 2017-12-15 ENCOUNTER — PATIENT OUTREACH (OUTPATIENT)
Dept: OTHER | Facility: OTHER | Age: 42
End: 2017-12-15

## 2017-12-15 NOTE — PROGRESS NOTES
Last 5 Patient Entered Readings Current 30 Day Average: 122/87       Units 12/11/2017 12/11/2017 12/11/2017 12/11/2017 12/10/2017    Time - 12:15 PM 12:14 PM 12:13 PM 12:00 AM 12:00 AM    Systolic Blood Pressure - 120 121 135 - -    Diastolic Blood Pressure - 86 81 92 - -    Pulse bpm 85 80 79 - -    Steps Walked - - - - [phone]          Hypertension Digital Medicine (HDMP) Health  Follow Up    LVM to follow up with Mr. Paolo Carver Jr..    Per 30 day average, blood pressure is well controlled 122/87 mmHg. Informed him of the new BP guidelines. Inquired about whether or not he is interested in enrolling in the Care Chat service. Encouraged adherence to low sodium diet and physical activity guidelines. Requested patient call or message back so we can discuss his readings/obtain an update. Will continue to monitor/follow up.

## 2018-01-08 ENCOUNTER — PATIENT OUTREACH (OUTPATIENT)
Dept: OTHER | Facility: OTHER | Age: 43
End: 2018-01-08

## 2018-01-08 NOTE — PROGRESS NOTES
Last 5 Patient Entered Readings Current 30 Day Average: 123/86     Recent Readings 1/1/2018 12/28/2017 12/28/2017 12/28/2017 12/28/2017    SBP (mmHg) 117 117 125 145 155    DBP (mmHg) 86 77 81 92 102    Pulse 89 88 88 89 92          Hypertension Digital Medicine (HDMP) Health  Follow Up    LVM to follow up with Mr. Paloo Carver Jr..    Per 30 day average, blood pressure is not well controlled 123/86 mmHg. Encouraged adherence to low sodium diet and physical activity guidelines. Requested patient call or message back so we can discuss his readings/obtain an update. Will continue to monitor/follow up.

## 2018-01-18 ENCOUNTER — LAB VISIT (OUTPATIENT)
Dept: LAB | Facility: HOSPITAL | Age: 43
End: 2018-01-18
Payer: COMMERCIAL

## 2018-01-18 LAB
ALBUMIN SERPL BCP-MCNC: 3.8 G/DL
ALP SERPL-CCNC: 69 U/L
ALT SERPL W/O P-5'-P-CCNC: 35 U/L
ANION GAP SERPL CALC-SCNC: 7 MMOL/L
AST SERPL-CCNC: 19 U/L
BILIRUB SERPL-MCNC: 0.6 MG/DL
BUN SERPL-MCNC: 15 MG/DL
CALCIUM SERPL-MCNC: 9.3 MG/DL
CHLORIDE SERPL-SCNC: 106 MMOL/L
CHOLEST SERPL-MCNC: 146 MG/DL
CHOLEST/HDLC SERPL: 3.4 {RATIO}
CO2 SERPL-SCNC: 28 MMOL/L
CREAT SERPL-MCNC: 1 MG/DL
EST. GFR  (AFRICAN AMERICAN): >60 ML/MIN/1.73 M^2
EST. GFR  (NON AFRICAN AMERICAN): >60 ML/MIN/1.73 M^2
GLUCOSE SERPL-MCNC: 80 MG/DL
HDLC SERPL-MCNC: 43 MG/DL
HDLC SERPL: 29.5 %
LDLC SERPL CALC-MCNC: 89.2 MG/DL
NONHDLC SERPL-MCNC: 103 MG/DL
POTASSIUM SERPL-SCNC: 4.2 MMOL/L
PROT SERPL-MCNC: 6.7 G/DL
SODIUM SERPL-SCNC: 141 MMOL/L
TRIGL SERPL-MCNC: 69 MG/DL

## 2018-01-18 PROCEDURE — 80061 LIPID PANEL: CPT

## 2018-01-18 PROCEDURE — 80053 COMPREHEN METABOLIC PANEL: CPT

## 2018-01-18 PROCEDURE — 83036 HEMOGLOBIN GLYCOSYLATED A1C: CPT

## 2018-01-18 PROCEDURE — 36415 COLL VENOUS BLD VENIPUNCTURE: CPT | Mod: PN

## 2018-01-19 LAB
ESTIMATED AVG GLUCOSE: 131 MG/DL
HBA1C MFR BLD HPLC: 6.2 %

## 2018-01-23 ENCOUNTER — TELEPHONE (OUTPATIENT)
Dept: ENDOCRINOLOGY | Facility: CLINIC | Age: 43
End: 2018-01-23

## 2018-01-24 ENCOUNTER — OFFICE VISIT (OUTPATIENT)
Dept: ENDOCRINOLOGY | Facility: CLINIC | Age: 43
End: 2018-01-24
Payer: COMMERCIAL

## 2018-01-24 VITALS
HEIGHT: 72 IN | HEART RATE: 65 BPM | BODY MASS INDEX: 37.42 KG/M2 | WEIGHT: 276.25 LBS | SYSTOLIC BLOOD PRESSURE: 115 MMHG | DIASTOLIC BLOOD PRESSURE: 82 MMHG

## 2018-01-24 DIAGNOSIS — I10 ESSENTIAL HYPERTENSION: ICD-10-CM

## 2018-01-24 DIAGNOSIS — E11.9 CONTROLLED TYPE 2 DIABETES MELLITUS WITHOUT COMPLICATION, UNSPECIFIED LONG TERM INSULIN USE STATUS: Primary | ICD-10-CM

## 2018-01-24 DIAGNOSIS — E66.9 NON MORBID OBESITY, UNSPECIFIED OBESITY TYPE: ICD-10-CM

## 2018-01-24 DIAGNOSIS — F17.201 TOBACCO ABUSE, IN REMISSION: ICD-10-CM

## 2018-01-24 PROCEDURE — 99999 PR PBB SHADOW E&M-EST. PATIENT-LVL III: CPT | Mod: PBBFAC,,, | Performed by: NURSE PRACTITIONER

## 2018-01-24 PROCEDURE — 99214 OFFICE O/P EST MOD 30 MIN: CPT | Mod: S$GLB,,, | Performed by: NURSE PRACTITIONER

## 2018-01-24 RX ORDER — METFORMIN HYDROCHLORIDE 500 MG/1
1000 TABLET, EXTENDED RELEASE ORAL 2 TIMES DAILY WITH MEALS
Qty: 360 TABLET | Refills: 1 | Status: SHIPPED | OUTPATIENT
Start: 2018-01-24 | End: 2018-04-25 | Stop reason: SDUPTHER

## 2018-01-24 NOTE — PATIENT INSTRUCTIONS
Start Trulicity 0.75 mg once weekly for 4 weeks. Then increase to Trulicity 1.5 mg once weekly.   When you start Trulicity, drop lantus dose to 16 units every morning.   Continue metformin.   Test blood sugar 1-2x/day.   Return to clinic in 3 months with labs prior.     Message with progress in 3 weeks - may need to reduce Lantus more by then.     Trulicity works by lowering glucoses whenever you eat carbohydrates. Therefore, it has a very low chance of lowering your glucose too much (i.e. Low risk of hypoglycemia). It also reduces appetite and decreases the rate of digestion, which may lead to some weight loss. The side effects include nausea and upset stomach. Risks include pancreatitis. Go to ER if having severe abdominal pain, nausea or vomiting.    In rodent studies, there was an increased risk for thyroid c-cell tumors; Risk in humans for medullary thyroid cancer. Do not take if you have personal or family history of thyroid medullary cancer or MEN2.

## 2018-01-24 NOTE — PROGRESS NOTES
CC: This 42 y.o. White male  is here for evaluation of  T2DM along with comorbidities indicated in the Visit Diagnosis section of this encounter.    HPI: Paolo Carver Jr. states he discovered he had diabetes just this month due to elevated A1c of > 14% associated with a 30 lb weight loss. However, his a1c was elevated at 6.9% in June 2016 and heather to 8.1% in January.       Prior visit on 10/25/17  Labs are negative for t1dm. But d/t highly uncontrolled BGs, he was started on basal insulin. Feels much better w/ more energy. Has no problems using insulin or staying on insulin therapy.   Plan BGs are near goal. Increase Lantus to 28 units every day.   Continue metformin.   Test bg 1-2x/day. rtc in 3 mo w/ labs prior.   Keep appt with diab educator next week.     Interval history  a1c down from > 14% to 6.2%.   He has gained 16 lb since initial visit presumably from insulin and probably from dietary indiscretions from holidays.     Has not smoked cigarettes x 3 weeks since starting Chantix.  He is motivated to stop because he has a baby on the way.     PMHX: JULIANNA on cpap     LAST DIABETES EDUCATION: never     PRESCRIBED DIABETES MEDICATIONS: metformin xr 1000 mg bid, Lantus Solostar 28 units every morning.   Misses medication doses - No    DM COMPLICATIONS: none    SIGNIFICANT DIABETES MED HISTORY: n/a     SELF MONITORING BLOOD GLUCOSE: Checks blood glucose at home a few times a week, fasting and bedtime, and they range .     HYPOGLYCEMIC EPISODES: denies symptoms; no night sweats.      CURRENT DIET: drinks water and diet coke zero. Eats 3 meals/day. Minimal snacks.     CURRENT EXERCISE: goes to gym a few times a week.     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       /82 (BP Location: Left arm, Patient Position: Sitting, BP Method: X-Large (Automatic))   Pulse 65   Ht 6' (1.829 m)   Wt 125.3 kg (276 lb 3.8 oz)   BMI 37.46 kg/m²       ROS:   CONSTITUTIONAL:  Appetite good, denies fatigue  RESPIRATORY: No shortness of breath   CARDIAC: No chest pain       PHYSICAL EXAM:  GENERAL: Well developed, well nourished. No acute distress.   PSYCH: AAOx3, appropriate mood and affect, conversant, well-groomed. Judgement and insight good.   NEURO: Cranial nerves grossly intact. Speech clear, no tremor.   CHEST: Respirations even and unlabored.      Hemoglobin A1C   Date Value Ref Range Status   01/18/2018 6.2 (H) 4.0 - 5.6 % Final     Comment:     According to ADA guidelines, hemoglobin A1c <7.0% represents  optimal control in non-pregnant diabetic patients. Different  metrics may apply to specific patient populations.   Standards of Medical Care in Diabetes-2016.  For the purpose of screening for the presence of diabetes:  <5.7%     Consistent with the absence of diabetes  5.7-6.4%  Consistent with increasing risk for diabetes   (prediabetes)  >or=6.5%  Consistent with diabetes  Currently, no consensus exists for use of hemoglobin A1c  for diagnosis of diabetes for children.  This Hemoglobin A1c assay has significant interference with fetal   hemoglobin   (HbF). The results are invalid for patients with abnormal amounts of   HbF,   including those with known Hereditary Persistence   of Fetal Hemoglobin. Heterozygous hemoglobin variants (HbAS, HbAC,   HbAD, HbAE, HbA2) do not significantly interfere with this assay;   however, presence of multiple variants in a sample may impact the %   interference.     09/15/2017 >14.0 (H) 4.0 - 5.6 % Final     Comment:     According to ADA guidelines, hemoglobin A1c <7.0% represents  optimal control in non-pregnant diabetic patients. Different  metrics may apply to specific patient populations.   Standards of Medical Care in Diabetes-2016.  For the purpose of screening for the presence of diabetes:  <5.7%     Consistent with the absence of diabetes  5.7-6.4%  Consistent with increasing risk for diabetes   (prediabetes)  >or=6.5%  Consistent with  diabetes  Currently, no consensus exists for use of hemoglobin A1c  for diagnosis of diabetes for children.  This Hemoglobin A1c assay has significant interference with fetal   hemoglobin   (HbF). The results are invalid for patients with abnormal amounts of   HbF,   including those with known Hereditary Persistence   of Fetal Hemoglobin. Heterozygous hemoglobin variants (HbAS, HbAC,   HbAD, HbAE, HbA2) do not significantly interfere with this assay;   however, presence of multiple variants in a sample may impact the %   interference.     01/11/2017 8.1 (H) 4.5 - 6.2 % Final     Comment:     According to ADA guidelines, hemoglobin A1C <7.0% represents  optimal control in non-pregnant diabetic patients.  Different  metrics may apply to specific populations.   Standards of Medical Care in Diabetes - 2016.  For the purpose of screening for the presence of diabetes:  <5.7%     Consistent with the absence of diabetes  5.7-6.4%  Consistent with increasing risk for diabetes   (prediabetes)  >or=6.5%  Consistent with diabetes  Currently no consensus exists for use of hemoglobin A1C  for diagnosis of diabetes for children.             Chemistry        Component Value Date/Time     01/18/2018 0750    K 4.2 01/18/2018 0750     01/18/2018 0750    CO2 28 01/18/2018 0750    BUN 15 01/18/2018 0750    CREATININE 1.0 01/18/2018 0750    GLU 80 01/18/2018 0750        Component Value Date/Time    CALCIUM 9.3 01/18/2018 0750    ALKPHOS 69 01/18/2018 0750    AST 19 01/18/2018 0750    ALT 35 01/18/2018 0750    BILITOT 0.6 01/18/2018 0750    ESTGFRAFRICA >60 01/18/2018 0750    EGFRNONAA >60 01/18/2018 0750          Lab Results   Component Value Date    LDLCALC 89.2 01/18/2018         Ref. Range 1/18/2018 07:50   Cholesterol Latest Ref Range: 120 - 199 mg/dL 146   HDL Latest Ref Range: 40 - 75 mg/dL 43   LDL Cholesterol Latest Ref Range: 63.0 - 159.0 mg/dL 89.2   Total Cholesterol/HDL Ratio Latest Ref Range: 2.0 - 5.0  3.4    Triglycerides Latest Ref Range: 30 - 150 mg/dL 69         Lab Results   Component Value Date    MICALBCREAT 4.6 01/18/2018           STANDARDS of CARE:        ASA:               Last eye exam:       ASSESSMENT and PLAN:    A1C GOAL: <7%     1. Controlled diabetes mellitus type 2 without complications, unspecified long term insulin use status  Diabetes is controlled but with inherent weight gain of insulin. Will try to wean patient off insulin with introduction of trulicity.    Start Trulicity 0.75 mg once weekly for 4 weeks. Then increase to Trulicity 1.5 mg once weekly.   When you start Trulicity, drop lantus dose to 16 units every morning.   Continue metformin.   Test blood sugar 1-2x/day.   Return to clinic in 3 months with labs prior.     Message with progress in 3 weeks - may need to reduce Lantus more by then.    2. Essential hypertension  controlled   3. Tobacco abuse, in remission  Encouraged continued cessation.    4. Non morbid obesity, unspecified obesity type  Increases insulin resistance.   Should lose weight with trulicity and dose reduction in insulin.          Orders Placed This Encounter   Procedures    Hemoglobin A1c     Standing Status:   Future     Standing Expiration Date:   3/25/2019        Follow-up in about 3 months (around 4/24/2018).

## 2018-01-29 ENCOUNTER — PATIENT MESSAGE (OUTPATIENT)
Dept: ENDOCRINOLOGY | Facility: CLINIC | Age: 43
End: 2018-01-29

## 2018-01-31 ENCOUNTER — TELEPHONE (OUTPATIENT)
Dept: ENDOCRINOLOGY | Facility: CLINIC | Age: 43
End: 2018-01-31

## 2018-01-31 NOTE — TELEPHONE ENCOUNTER
Called [facility] and started PA for Trulicity 0.75 mg and 1.5 mg as ordered. Approved on 1/31/18 indefinitely, Ref# [id number]. [facility] and patient notified.

## 2018-01-31 NOTE — PROGRESS NOTES
Last 5 Patient Entered Readings                                      Current 30 Day Average: 126/90     Recent Readings 1/30/2018 1/30/2018 1/23/2018 1/15/2018 1/11/2018    SBP (mmHg) 130 130 138 118 -    DBP (mmHg) 95 95 93 87 -    Pulse 76 76 65 81 85          Hypertension Digital Medicine (HDMP) Health  Follow Up    LVM to follow up with  Paolotommie Carver Jr..    Per 30 day average, blood pressure is not well controlled 126/90 mmHg. I want to discuss his sodium intake with him in depth. Encouraged adherence to low sodium diet and physical activity guidelines. Requested patient call or message back so we can discuss his readings/obtain an update. Will continue to monitor/follow up.

## 2018-02-06 ENCOUNTER — PATIENT MESSAGE (OUTPATIENT)
Dept: ENDOCRINOLOGY | Facility: CLINIC | Age: 43
End: 2018-02-06

## 2018-02-08 ENCOUNTER — PATIENT OUTREACH (OUTPATIENT)
Dept: OTHER | Facility: OTHER | Age: 43
End: 2018-02-08

## 2018-02-08 DIAGNOSIS — I10 ESSENTIAL HYPERTENSION: Primary | ICD-10-CM

## 2018-02-08 NOTE — PROGRESS NOTES
Last 5 Patient Entered Readings                                      Current 30 Day Average: 129/91     Recent Readings 2/19/2018 2/14/2018 2/6/2018 2/6/2018 2/1/2018    SBP (mmHg) 115 139 132 144 122    DBP (mmHg) 83 90 94 99 90    Pulse 82 79 62 65 85        Mr. Carver's DBP is above goal. Will add low dose HCTZ and increase if necessary. Will check BMP in 2-3 weeks.     Will continue to monitor regularly. Will follow up in 2-3 weeks, sooner if BP begins to trend upward or downward.    Patient has my contact information and knows to call with any concerns or clinical changes.     Current HTN regimen:  Hypertension Medications             benazepril (LOTENSIN) 20 MG tablet TK 1 T PO QD    hydroCHLOROthiazide (HYDRODIURIL) 12.5 MG Tab Take 1 tablet (12.5 mg total) by mouth once daily.

## 2018-02-22 RX ORDER — HYDROCHLOROTHIAZIDE 12.5 MG/1
12.5 TABLET ORAL DAILY
Qty: 30 TABLET | Refills: 3 | Status: SHIPPED | OUTPATIENT
Start: 2018-02-22 | End: 2018-03-23 | Stop reason: SDUPTHER

## 2018-03-08 ENCOUNTER — PATIENT OUTREACH (OUTPATIENT)
Dept: OTHER | Facility: OTHER | Age: 43
End: 2018-03-08

## 2018-03-08 DIAGNOSIS — I10 ESSENTIAL HYPERTENSION: ICD-10-CM

## 2018-03-08 NOTE — PROGRESS NOTES
Last 5 Patient Entered Readings                                      Current 30 Day Average: 127/87     Recent Readings 3/19/2018 3/19/2018 3/13/2018 3/5/2018 3/5/2018    SBP (mmHg) 142 142 126 121 133    DBP (mmHg) 93 99 88 84 91    Pulse 95 97 82 72 73        Patient's BP average is above goal of <130/80.     Mr. Carver's BP is improving since starting HCTZ. Will increase dose to 25 mg QD and check BMP in 1-2 weeks.     Will continue to monitor regularly. Will follow up in 2-3 weeks, sooner if BP begins to trend upward or downward.    Patient has my contact information and knows to call with any concerns or clinical changes.     Current HTN regimen:  Hypertension Medications             benazepril (LOTENSIN) 20 MG tablet TK 1 T PO QD    hydroCHLOROthiazide (HYDRODIURIL) 25 MG tablet Take 1 tablet (25 mg total) by mouth once daily.

## 2018-03-23 RX ORDER — HYDROCHLOROTHIAZIDE 25 MG/1
25 TABLET ORAL DAILY
Qty: 30 TABLET | Refills: 3 | Status: SHIPPED | OUTPATIENT
Start: 2018-03-23 | End: 2019-03-23

## 2018-03-23 RX ORDER — HYDROCHLOROTHIAZIDE 12.5 MG/1
12.5 TABLET ORAL DAILY
Qty: 30 TABLET | Refills: 3 | Status: SHIPPED | OUTPATIENT
Start: 2018-03-23 | End: 2018-03-23 | Stop reason: SDUPTHER

## 2018-04-06 ENCOUNTER — PATIENT OUTREACH (OUTPATIENT)
Dept: OTHER | Facility: OTHER | Age: 43
End: 2018-04-06

## 2018-04-06 NOTE — PROGRESS NOTES
Last 5 Patient Entered Readings                                      Current 30 Day Average: 123/86     Recent Readings 4/5/2018 4/5/2018 3/30/2018 3/30/2018 3/28/2018    SBP (mmHg) 115 131 121 134 123    DBP (mmHg) 87 93 91 100 69    Pulse 89 89 86 87 98        Mr. Carver is tolerating increased HCTZ dose with no issues or side effects. He was unable to make it lab as scheduled, but will go on Monday. Will continue current regimen for now and I will follow up once lab results are reviewed.     Will continue to monitor regularly. Will follow up in 3-4 weeks, sooner if BP begins to trend upward or downward.    Patient has my contact information and knows to call with any concerns or clinical changes.     Current HTN regimen:  Hypertension Medications             benazepril (LOTENSIN) 20 MG tablet TK 1 T PO QD    hydroCHLOROthiazide (HYDRODIURIL) 25 MG tablet Take 1 tablet (25 mg total) by mouth once daily.

## 2018-04-09 ENCOUNTER — LAB VISIT (OUTPATIENT)
Dept: LAB | Facility: HOSPITAL | Age: 43
End: 2018-04-09
Payer: COMMERCIAL

## 2018-04-09 DIAGNOSIS — I10 ESSENTIAL HYPERTENSION: ICD-10-CM

## 2018-04-09 LAB
ANION GAP SERPL CALC-SCNC: 8 MMOL/L
BUN SERPL-MCNC: 14 MG/DL
CALCIUM SERPL-MCNC: 9.9 MG/DL
CHLORIDE SERPL-SCNC: 103 MMOL/L
CO2 SERPL-SCNC: 28 MMOL/L
CREAT SERPL-MCNC: 1.1 MG/DL
EST. GFR  (AFRICAN AMERICAN): >60 ML/MIN/1.73 M^2
EST. GFR  (NON AFRICAN AMERICAN): >60 ML/MIN/1.73 M^2
GLUCOSE SERPL-MCNC: 150 MG/DL
POTASSIUM SERPL-SCNC: 4.5 MMOL/L
SODIUM SERPL-SCNC: 139 MMOL/L

## 2018-04-09 PROCEDURE — 80048 BASIC METABOLIC PNL TOTAL CA: CPT

## 2018-04-09 PROCEDURE — 36415 COLL VENOUS BLD VENIPUNCTURE: CPT | Mod: PN

## 2018-04-17 ENCOUNTER — PATIENT OUTREACH (OUTPATIENT)
Dept: OTHER | Facility: OTHER | Age: 43
End: 2018-04-17

## 2018-04-17 NOTE — LETTER
Mone Clifford  [address] [place] [address]     Dear Paolo,    Thank you for enrolling in the [facility] Hypertension Digital Medicine Program. To participate in our program, we ask that you submit a blood pressure reading at least once weekly through your [facility] Account and maintain regular contact with your Care Team.  We have not received data regularly and have not heard from you in some time.     The Digital Medicine Care Team has attempted to reach you on multiple occasions to determine if you would like to continue participating in the program. While we encourage you to continue participating fully, we understand that circumstances may change.      To continue participating in the program, please contact me at [phone]. If we do not hear back, you will be un-enrolled and your physician will be notified of your decision.    We look forward to hearing from you soon.    Sincerely,     Mone Cliffodr  Your Personal [facility]                                                                                                                         Paolo Carver Jr.  [address] [place] [address]

## 2018-04-17 NOTE — LETTER
Mone Frankel, PharmD  [address] [place] [address]     Dear Paolo,    We have made several attempts to encourage your participation in Digital Medicine monitoring. Unfortunately, we have been unsuccessful and this is an official notice that you are no longer enrolled in Hypertension Digital Medicine Program, and thus, we will no longer be managing your hypertension.    Please note this has no impact on your relationship with [facility] or your providers. Going forward, please reach out to your primary care provider with any questions or concerns regarding your health.                         Please contact [phone] if you have any additional questions.     Sincerely,    The [facility] Digital [facility] Team                                                                                                                                            Paolo Carver Jr.  [address] [place] [address]

## 2018-04-17 NOTE — LETTER
Mone Frankel, PharmD  [address] [place] [address]     Dear Paolo,    We have made several attempts to encourage your participation in Digital Medicine monitoring. Unfortunately, we have been unsuccessful and this is an official notice that you are no longer enrolled in Hypertension Digital Medicine Program, and thus, we will no longer be managing your hypertension.    Please note this has no impact on your relationship with [facility] or your providers. Going forward, please reach out to your primary care provider with any questions or concerns regarding your health.                         Please contact [phone] if you have any additional questions.     Sincerely,    [facility] Team                                                                                                                                            Paolo Carver Jr.  [address] [place] [address]

## 2018-04-17 NOTE — PROGRESS NOTES
Last 5 Patient Entered Readings                                      Current 30 Day Average: 123/86     Recent Readings 4/17/2018 4/17/2018 4/10/2018 4/5/2018 4/5/2018    SBP (mmHg) 133 137 117 115 131    DBP (mmHg) 91 91 88 87 93    Pulse 91 93 94 89 89          Hypertension Digital Medicine (HDMP) Health  Follow Up    LVM to follow up with Mr. Paolo Carver Jr..    Per 30 day average, blood pressure is not well controlled 123/86 mmHg. Encouraged adherence to low sodium diet and physical activity guidelines. Requested patient call or message back so we can discuss his readings/obtain an update. Will continue to monitor/follow up.

## 2018-04-19 ENCOUNTER — LAB VISIT (OUTPATIENT)
Dept: LAB | Facility: HOSPITAL | Age: 43
End: 2018-04-19
Attending: NURSE PRACTITIONER
Payer: COMMERCIAL

## 2018-04-19 DIAGNOSIS — E11.9 CONTROLLED TYPE 2 DIABETES MELLITUS WITHOUT COMPLICATION, UNSPECIFIED LONG TERM INSULIN USE STATUS: ICD-10-CM

## 2018-04-19 LAB
ESTIMATED AVG GLUCOSE: 128 MG/DL
HBA1C MFR BLD HPLC: 6.1 %

## 2018-04-19 PROCEDURE — 36415 COLL VENOUS BLD VENIPUNCTURE: CPT | Mod: PN

## 2018-04-19 PROCEDURE — 83036 HEMOGLOBIN GLYCOSYLATED A1C: CPT

## 2018-04-24 ENCOUNTER — TELEPHONE (OUTPATIENT)
Dept: ENDOCRINOLOGY | Facility: CLINIC | Age: 43
End: 2018-04-24

## 2018-04-25 ENCOUNTER — OFFICE VISIT (OUTPATIENT)
Dept: ENDOCRINOLOGY | Facility: CLINIC | Age: 43
End: 2018-04-25
Payer: COMMERCIAL

## 2018-04-25 VITALS
SYSTOLIC BLOOD PRESSURE: 126 MMHG | WEIGHT: 281.31 LBS | HEIGHT: 72 IN | DIASTOLIC BLOOD PRESSURE: 86 MMHG | HEART RATE: 68 BPM | BODY MASS INDEX: 38.1 KG/M2

## 2018-04-25 DIAGNOSIS — F17.201 TOBACCO ABUSE, IN REMISSION: ICD-10-CM

## 2018-04-25 DIAGNOSIS — E11.9 CONTROLLED TYPE 2 DIABETES MELLITUS WITHOUT COMPLICATION, WITHOUT LONG-TERM CURRENT USE OF INSULIN: Primary | ICD-10-CM

## 2018-04-25 PROCEDURE — 99999 PR PBB SHADOW E&M-EST. PATIENT-LVL III: CPT | Mod: PBBFAC,,, | Performed by: NURSE PRACTITIONER

## 2018-04-25 PROCEDURE — 3074F SYST BP LT 130 MM HG: CPT | Mod: CPTII,S$GLB,, | Performed by: NURSE PRACTITIONER

## 2018-04-25 PROCEDURE — 99213 OFFICE O/P EST LOW 20 MIN: CPT | Mod: S$GLB,,, | Performed by: NURSE PRACTITIONER

## 2018-04-25 PROCEDURE — 3044F HG A1C LEVEL LT 7.0%: CPT | Mod: CPTII,S$GLB,, | Performed by: NURSE PRACTITIONER

## 2018-04-25 PROCEDURE — 3079F DIAST BP 80-89 MM HG: CPT | Mod: CPTII,S$GLB,, | Performed by: NURSE PRACTITIONER

## 2018-04-25 RX ORDER — METFORMIN HYDROCHLORIDE 500 MG/1
1000 TABLET, EXTENDED RELEASE ORAL 2 TIMES DAILY WITH MEALS
Qty: 360 TABLET | Refills: 1 | Status: SHIPPED | OUTPATIENT
Start: 2018-04-25 | End: 2019-04-25

## 2018-04-25 NOTE — PROGRESS NOTES
CC: This 42 y.o. White male  is here for evaluation of  T2DM along with comorbidities indicated in the Visit Diagnosis section of this encounter.    HPI: Paolo Carver . states he discovered he had diabetes just this month due to elevated A1c of > 14% associated with a 30 lb weight loss. However, his a1c was elevated at 6.9% in June 2016 and heather to 8.1% in January.     Prior visit on 1/24/18  a1c down from > 14% to 6.2%.   He has gained 16 lb since initial visit presumably from insulin and probably from dietary indiscretions from holidays.   Has not smoked cigarettes x 3 weeks since starting Chantix.  He is motivated to stop because he has a baby on the way.   Plan Diabetes is controlled but with inherent weight gain of insulin. Will try to wean patient off insulin with introduction of trulicity.  Start Trulicity 0.75 mg once weekly for 4 weeks. Then increase to Trulicity 1.5 mg once weekly.   When you start Trulicity, drop lantus dose to 16 units every morning.   Continue metformin.   Test blood sugar 1-2x/day.   Return to clinic in 3 months with labs prior.     Interval history  a1c stable from 6.2 down to 6.1%. He has been off of Lantus for 2 months. Testing BGs about 1x/week at various times of day and BGs are mostly 110-130s.   He has not smoked x 4 months now.     PMHX: JULIANNA on cpap     LAST DIABETES EDUCATION: never     PRESCRIBED DIABETES MEDICATIONS: metformin xr 1000 mg bid, Trulicity 1.5 mg once daily   Misses medication doses - No    DM COMPLICATIONS: none    SIGNIFICANT DIABETES MED HISTORY: n/a     SELF MONITORING BLOOD GLUCOSE: as above      HYPOGLYCEMIC EPISODES: denies symptoms; no night sweats.      CURRENT DIET: drinks water and diet coke zero. Eats 3 meals/day. Minimal snacks.     CURRENT EXERCISE: goes to gym a few times a week.     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       /86 (BP Location: Right arm, Patient Position: Sitting, BP  Method: X-Large (Automatic))   Pulse 68   Ht 6' (1.829 m)   Wt 127.6 kg (281 lb 4.9 oz)   BMI 38.15 kg/m²       ROS:   CONSTITUTIONAL: Appetite good, denies fatigue  RESPIRATORY: No shortness of breath   CARDIAC: No chest pain   GI: Negative       PHYSICAL EXAM:  GENERAL: Well developed, well nourished. No acute distress.   PSYCH: AAOx3, appropriate mood and affect, conversant, well-groomed. Judgement and insight good.   NEURO: Cranial nerves grossly intact. Speech clear, no tremor.   CHEST: Respirations even and unlabored.      Hemoglobin A1C   Date Value Ref Range Status   04/19/2018 6.1 (H) 4.0 - 5.6 % Final     Comment:     According to ADA guidelines, hemoglobin A1c <7.0% represents  optimal control in non-pregnant diabetic patients. Different  metrics may apply to specific patient populations.   Standards of Medical Care in Diabetes-2016.  For the purpose of screening for the presence of diabetes:  <5.7%     Consistent with the absence of diabetes  5.7-6.4%  Consistent with increasing risk for diabetes   (prediabetes)  >or=6.5%  Consistent with diabetes  Currently, no consensus exists for use of hemoglobin A1c  for diagnosis of diabetes for children.  This Hemoglobin A1c assay has significant interference with fetal   hemoglobin   (HbF). The results are invalid for patients with abnormal amounts of   HbF,   including those with known Hereditary Persistence   of Fetal Hemoglobin. Heterozygous hemoglobin variants (HbAS, HbAC,   HbAD, HbAE, HbA2) do not significantly interfere with this assay;   however, presence of multiple variants in a sample may impact the %   interference.     01/18/2018 6.2 (H) 4.0 - 5.6 % Final     Comment:     According to ADA guidelines, hemoglobin A1c <7.0% represents  optimal control in non-pregnant diabetic patients. Different  metrics may apply to specific patient populations.   Standards of Medical Care in Diabetes-2016.  For the purpose of screening for the presence of  diabetes:  <5.7%     Consistent with the absence of diabetes  5.7-6.4%  Consistent with increasing risk for diabetes   (prediabetes)  >or=6.5%  Consistent with diabetes  Currently, no consensus exists for use of hemoglobin A1c  for diagnosis of diabetes for children.  This Hemoglobin A1c assay has significant interference with fetal   hemoglobin   (HbF). The results are invalid for patients with abnormal amounts of   HbF,   including those with known Hereditary Persistence   of Fetal Hemoglobin. Heterozygous hemoglobin variants (HbAS, HbAC,   HbAD, HbAE, HbA2) do not significantly interfere with this assay;   however, presence of multiple variants in a sample may impact the %   interference.     09/15/2017 >14.0 (H) 4.0 - 5.6 % Final     Comment:     According to ADA guidelines, hemoglobin A1c <7.0% represents  optimal control in non-pregnant diabetic patients. Different  metrics may apply to specific patient populations.   Standards of Medical Care in Diabetes-2016.  For the purpose of screening for the presence of diabetes:  <5.7%     Consistent with the absence of diabetes  5.7-6.4%  Consistent with increasing risk for diabetes   (prediabetes)  >or=6.5%  Consistent with diabetes  Currently, no consensus exists for use of hemoglobin A1c  for diagnosis of diabetes for children.  This Hemoglobin A1c assay has significant interference with fetal   hemoglobin   (HbF). The results are invalid for patients with abnormal amounts of   HbF,   including those with known Hereditary Persistence   of Fetal Hemoglobin. Heterozygous hemoglobin variants (HbAS, HbAC,   HbAD, HbAE, HbA2) do not significantly interfere with this assay;   however, presence of multiple variants in a sample may impact the %   interference.             Chemistry        Component Value Date/Time     04/09/2018 0827    K 4.5 04/09/2018 0827     04/09/2018 0827    CO2 28 04/09/2018 0827    BUN 14 04/09/2018 0827    CREATININE 1.1 04/09/2018  0827     (H) 04/09/2018 0827        Component Value Date/Time    CALCIUM 9.9 04/09/2018 0827    ALKPHOS 69 01/18/2018 0750    AST 19 01/18/2018 0750    ALT 35 01/18/2018 0750    BILITOT 0.6 01/18/2018 0750    ESTGFRAFRICA >60 04/09/2018 0827    EGFRNONAA >60 04/09/2018 0827          Lab Results   Component Value Date    LDLCALC 89.2 01/18/2018         Ref. Range 1/18/2018 07:50   Cholesterol Latest Ref Range: 120 - 199 mg/dL 146   HDL Latest Ref Range: 40 - 75 mg/dL 43   LDL Cholesterol Latest Ref Range: 63.0 - 159.0 mg/dL 89.2   Total Cholesterol/HDL Ratio Latest Ref Range: 2.0 - 5.0  3.4   Triglycerides Latest Ref Range: 30 - 150 mg/dL 69         Lab Results   Component Value Date    MICALBCREAT 4.6 01/18/2018           STANDARDS of CARE:        ASA:               Last eye exam:       ASSESSMENT and PLAN:    A1C GOAL: <7%       1. Controlled type 2 diabetes mellitus without complication, without long-term current use of insulin  Continue metformin and trulicity 1.5 mg once weekly.   F/u with Dr. Ayala for chronic DM management. Recommend f/u in 6 month.     rtc prn.      2. Tobacco abuse, in remission  Continue cessation. Praised him on his progress.        No orders of the defined types were placed in this encounter.       Follow-up if symptoms worsen or fail to improve.

## 2018-05-04 ENCOUNTER — PATIENT OUTREACH (OUTPATIENT)
Dept: OTHER | Facility: OTHER | Age: 43
End: 2018-05-04

## 2018-05-04 NOTE — PROGRESS NOTES
Last 5 Patient Entered Readings                                      Current 30 Day Average: 124/89     Recent Readings 5/4/2018 5/4/2018 5/1/2018 4/25/2018 4/25/2018    SBP (mmHg) 122 118 128 132 130    DBP (mmHg) 92 93 88 89 94    Pulse 95 95 72 81 88        Mr. Carver's BP readings are improving on current regimen. He feels well and has no concerns. His BMP was WNL.     Patient denies s/s of hypotension (lightheadedness, dizziness, nausea, fatigue) associated with low readings. Instructed patient to inform me if this occurs, patient confirms understanding.      Will continue to monitor regularly. Will follow up in 6-8 weeks, sooner if BP begins to trend upward or downward.    Patient has my contact information and knows to call with any concerns or clinical changes.     Current HTN regimen:  Hypertension Medications             benazepril (LOTENSIN) 20 MG tablet TK 1 T PO QD    hydroCHLOROthiazide (HYDRODIURIL) 25 MG tablet Take 1 tablet (25 mg total) by mouth once daily.

## 2018-05-18 DIAGNOSIS — G47.33 OSA (OBSTRUCTIVE SLEEP APNEA): Primary | ICD-10-CM

## 2018-05-30 ENCOUNTER — PATIENT MESSAGE (OUTPATIENT)
Dept: FAMILY MEDICINE | Facility: CLINIC | Age: 43
End: 2018-05-30

## 2018-05-30 DIAGNOSIS — M10.9 ACUTE GOUT OF LEFT FOOT, UNSPECIFIED CAUSE: ICD-10-CM

## 2018-05-30 RX ORDER — INDOMETHACIN 25 MG/1
25 CAPSULE ORAL 3 TIMES DAILY PRN
Qty: 30 CAPSULE | Refills: 6 | Status: SHIPPED | OUTPATIENT
Start: 2018-05-30

## 2018-06-06 NOTE — PROGRESS NOTES
Last 5 Patient Entered Readings                                      Current 30 Day Average: 119/87     Recent Readings 5/31/2018 5/31/2018 5/26/2018 5/26/2018 5/20/2018    SBP (mmHg) 128 126 103 98 131    DBP (mmHg) 98 99 79 73 92    Pulse 122 120 118 116 108            Digital Medicine: Health  Follow Up    Left voicemail to follow up with Mr. Paolo Carver Jr..  Current BP average 119/87 mmHg is not at goal, <130/80.

## 2018-06-25 NOTE — PROGRESS NOTES
Last 5 Patient Entered Readings                                      Current 30 Day Average: 119/88     Recent Readings 6/17/2018 6/17/2018 5/31/2018 5/31/2018 5/26/2018    SBP (mmHg) 127 136 128 126 103    DBP (mmHg) 88 100 98 99 79    Pulse 78 83 122 120 118            Digital Medicine: Health  Follow Up    Left voicemail to follow up with Mr. Paolo Carver Jr..  Current BP average 119/88 mmHg is not at goal, <130/80.  Want to discuss his diastolic numbers with him.

## 2018-07-11 NOTE — PROGRESS NOTES
Last 5 Patient Entered Readings                                      Current 30 Day Average: 120/88     Recent Readings 6/28/2018 6/17/2018 6/17/2018 5/31/2018 5/31/2018    SBP (mmHg) 112 127 136 128 126    DBP (mmHg) 88 88 100 98 99    Pulse 103 78 83 122 120            Digital Medicine: Health  Follow Up    Left voicemail to follow up with Mr. Paolo Carver Jr..  Current BP average 120/88 mmHg is not at goal, <130/80.  Want to discuss his diastolic numbers with him.

## 2018-07-23 ENCOUNTER — PATIENT OUTREACH (OUTPATIENT)
Dept: OTHER | Facility: OTHER | Age: 43
End: 2018-07-23

## 2018-07-23 NOTE — PROGRESS NOTES
Last 5 Patient Entered Readings                                      Current 30 Day Average: 117/85     Recent Readings 7/19/2018 7/19/2018 6/28/2018 6/17/2018 6/17/2018    SBP (mmHg) 121 148 112 127 136    DBP (mmHg) 81 94 88 88 100    Pulse 114 114 103 78 83        Patient has not been in contact with [facility] care team. Patient has been removed from [facility].

## 2018-08-13 NOTE — PROGRESS NOTES
Last 5 Patient Entered Readings                                      Current 30 Day Average: 124/88     Recent Readings 8/5/2018 8/5/2018 7/19/2018 7/19/2018 6/28/2018    SBP (mmHg) 126 134 121 148 112    DBP (mmHg) 94 95 81 94 88    Pulse 96 94 114 114 103            Digital Medicine: Health  Follow Up    Left voicemail to follow up with Mr. Paolo Carver Jr..  Current BP average 124/88 mmHg is not at goal,  <130/80.

## 2018-08-27 NOTE — PROGRESS NOTES
Last 5 Patient Entered Readings                                      Current 30 Day Average: 120/90     Recent Readings 8/22/2018 8/22/2018 8/5/2018 8/5/2018 7/19/2018    SBP (mmHg) 113 132 126 134 121    DBP (mmHg) 85 95 94 95 81    Pulse 95 90 96 94 114            Digital Medicine: Health  Follow Up    Left voicemail to follow up with Mr. Paolo Carver Jr..  Current BP average 120/90 mmHg is not at goal, <130/80.  Sending non-compliance letter today.

## 2018-09-10 NOTE — PROGRESS NOTES
Last 5 Patient Entered Readings                                      Current 30 Day Average: 113/85     Recent Readings 8/29/2018 8/22/2018 8/22/2018 8/5/2018 8/5/2018    SBP (mmHg) 112 113 132 126 134    DBP (mmHg) 84 85 95 94 95    Pulse 92 95 90 96 94            Digital Medicine: Health  Follow Up    Left voicemail to follow up with Zara Carver Jr..  Current BP average 113/85 mmHg is not at goal, <130/80.  Patient takes BP readings here and there but is not consistent.  He does not remain in contact with his care team.  Sent non-compliance letter on 8/27.  Will message enrolling provider today.  If no response in 2 weeks, will sent certified DC letter.

## 2018-09-24 NOTE — PROGRESS NOTES
Last 5 Patient Entered Readings                                      Current 30 Day Average: 124/91     Recent Readings 9/20/2018 9/20/2018 8/29/2018 8/22/2018 8/22/2018    SBP (mmHg) 135 130 112 113 132    DBP (mmHg) 97 102 84 85 95    Pulse 81 77 92 95 90            Digital Medicine: Health  Follow Up    Left voicemail to follow up with Mr. Boone Mehul Klein.  Current BP average 124/91 mmHg is not at goal, <130/80.  Have not spoken with patient since March/April 2018.  He does not remain in contact with his care team.  Sent non-compliance letter on 8/27.  Will message enrolling provider today.  Sending certified DC letter today.   DC letter : [phone]

## 2018-10-01 RX ORDER — DULAGLUTIDE 1.5 MG/.5ML
INJECTION, SOLUTION SUBCUTANEOUS
Qty: 4 SYRINGE | Refills: 0 | Status: SHIPPED | OUTPATIENT
Start: 2018-10-01

## 2018-10-01 NOTE — TELEPHONE ENCOUNTER
rx sent for 30 day supply. He was discharged from Endocrine back to Primary Care at his last visit in April.  He should make f/u visit with PCP for DM and further refills.

## 2018-10-08 NOTE — PROGRESS NOTES
Last 5 Patient Entered Readings                                      Current 30 Day Average: 135/97     Recent Readings 9/20/2018 9/20/2018 8/29/2018 8/22/2018 8/22/2018    SBP (mmHg) 135 130 112 113 132    DBP (mmHg) 97 102 84 85 95    Pulse 81 77 92 95 90          Resending certified DC letter today: [phone]

## 2018-10-18 ENCOUNTER — TELEPHONE (OUTPATIENT)
Dept: FAMILY MEDICINE | Facility: CLINIC | Age: 43
End: 2018-10-18

## 2018-10-18 NOTE — TELEPHONE ENCOUNTER
Patient's wife states that per insurance carrier web Dr. Ayala was out of network. Patient wife was advise that if its states he out of network he probably is and that she should contact the insurance company directly.

## 2018-10-18 NOTE — TELEPHONE ENCOUNTER
----- Message from Brigitte Smith sent at 10/18/2018  8:35 AM CDT -----  Contact: alyson Shields called in about wanting to see if  takes pt new insurance. Pt would like to stay with  if he can.Pt now has [facility] [employer].      Alyson can be reached at [phone]      TY

## 2018-10-22 NOTE — PROGRESS NOTES
Last 5 Patient Entered Readings                                      Current 30 Day Average:      Recent Readings 9/20/2018 9/20/2018 8/29/2018 8/22/2018 8/22/2018    SBP (mmHg) 135 130 112 113 132    DBP (mmHg) 97 102 84 85 95    Pulse 81 77 92 95 90          Certified DC letter is still in transit. Will check back in 2 weeks.  [phone]

## 2018-11-07 NOTE — PROGRESS NOTES
Last 5 Patient Entered Readings                                      Current 30 Day Average:      Recent Readings 9/20/2018 9/20/2018 8/29/2018 8/22/2018 8/22/2018    SBP (mmHg) 135 130 112 113 132    DBP (mmHg) 97 102 84 85 95    Pulse 81 77 92 95 90            Digital Medicine: Health  Follow Up    Dropping Zara Carver Jr. from the HTN program today.  Will inform his PharmD, KATARZYNA Frankel, and his enrolling physician.